# Patient Record
Sex: FEMALE | Race: BLACK OR AFRICAN AMERICAN
[De-identification: names, ages, dates, MRNs, and addresses within clinical notes are randomized per-mention and may not be internally consistent; named-entity substitution may affect disease eponyms.]

---

## 2017-07-29 ENCOUNTER — HOSPITAL ENCOUNTER (INPATIENT)
Dept: HOSPITAL 74 - JER | Age: 22
LOS: 12 days | Discharge: HOME | DRG: 872 | End: 2017-08-10
Attending: INTERNAL MEDICINE | Admitting: INTERNAL MEDICINE
Payer: COMMERCIAL

## 2017-07-29 VITALS — BODY MASS INDEX: 19.5 KG/M2

## 2017-07-29 DIAGNOSIS — K51.90: ICD-10-CM

## 2017-07-29 DIAGNOSIS — E87.6: ICD-10-CM

## 2017-07-29 DIAGNOSIS — T38.0X5A: ICD-10-CM

## 2017-07-29 DIAGNOSIS — L29.2: ICD-10-CM

## 2017-07-29 DIAGNOSIS — D62: ICD-10-CM

## 2017-07-29 DIAGNOSIS — K62.5: ICD-10-CM

## 2017-07-29 DIAGNOSIS — D72.828: ICD-10-CM

## 2017-07-29 DIAGNOSIS — E83.51: ICD-10-CM

## 2017-07-29 DIAGNOSIS — Z51.89: ICD-10-CM

## 2017-07-29 DIAGNOSIS — A41.9: Primary | ICD-10-CM

## 2017-07-29 DIAGNOSIS — K64.4: ICD-10-CM

## 2017-07-29 DIAGNOSIS — R31.9: ICD-10-CM

## 2017-07-29 PROCEDURE — P9058 RBC, L/R, CMV-NEG, IRRAD: HCPCS

## 2017-07-29 PROCEDURE — P9038 RBC IRRADIATED: HCPCS

## 2017-07-29 NOTE — PDOC
*Physical Exam





- Vital Signs


 Last Vital Signs











Temp Pulse Resp BP Pulse Ox


 


 99.5 F   119 H  20   102/45   98 


 


 07/29/17 21:31  07/29/17 21:31  07/29/17 21:31  07/29/17 21:31  07/29/17 21:31














- Physical Exam


Comments: 





07/29/17 23:58


Patient is a 21 year old male





Medical Decision Making





- Medical Decision Making





07/29/17 23:59


21 year old male

## 2017-07-30 LAB
ALBUMIN SERPL-MCNC: 2.8 G/DL (ref 3.4–5)
ALP SERPL-CCNC: 61 U/L (ref 45–117)
ALT SERPL-CCNC: 9 U/L (ref 12–78)
ANION GAP SERPL CALC-SCNC: 13 MMOL/L (ref 8–16)
APPEARANCE UR: CLEAR
APPEARANCE UR: CLEAR
AST SERPL-CCNC: 13 U/L (ref 15–37)
BACTERIA #/AREA URNS HPF: (no result) /HPF
BASOPHILS # BLD: 0.1 % (ref 0–2)
BILIRUB SERPL-MCNC: 0.7 MG/DL (ref 0.2–1)
BILIRUB UR STRIP.AUTO-MCNC: NEGATIVE MG/DL
BILIRUB UR STRIP.AUTO-MCNC: NEGATIVE MG/DL
CALCIUM SERPL-MCNC: 8 MG/DL (ref 8.5–10.1)
CK SERPL-CCNC: 69 IU/L (ref 26–192)
CO2 SERPL-SCNC: 24 MMOL/L (ref 21–32)
COLOR UR: (no result)
COLOR UR: YELLOW
CREAT SERPL-MCNC: 0.7 MG/DL (ref 0.55–1.02)
DEPRECATED RDW RBC AUTO: 15.3 % (ref 11.6–15.6)
DEPRECATED RDW RBC AUTO: 15.6 % (ref 11.6–15.6)
DEPRECATED RDW RBC AUTO: 15.7 % (ref 11.6–15.6)
DOHLE BOD BLD QL SMEAR: (no result)
EOSINOPHIL # BLD: 1 % (ref 0–4.5)
EOSINOPHIL # BLD: 1 % (ref 0–4.5)
FERRITIN SERPL-MCNC: 253.88 NG/ML (ref 6.9–282.5)
GLUCOSE SERPL-MCNC: 94 MG/DL (ref 74–106)
HYPOCHROMIA BLD QL SMEAR: (no result)
INR BLD: 1.61 (ref 0.82–1.09)
KETONES UR QL STRIP: (no result)
KETONES UR QL STRIP: (no result)
LEUKOCYTE ESTERASE UR QL STRIP.AUTO: (no result)
LEUKOCYTE ESTERASE UR QL STRIP.AUTO: (no result)
MCH RBC QN AUTO: 28.5 PG (ref 25.7–33.7)
MCH RBC QN AUTO: 28.8 PG (ref 25.7–33.7)
MCH RBC QN AUTO: 29 PG (ref 25.7–33.7)
MCHC RBC AUTO-ENTMCNC: 32.9 G/DL (ref 32–36)
MCHC RBC AUTO-ENTMCNC: 33.5 G/DL (ref 32–36)
MCHC RBC AUTO-ENTMCNC: 33.7 G/DL (ref 32–36)
MCV RBC: 86 FL (ref 80–96)
MCV RBC: 86.1 FL (ref 80–96)
MCV RBC: 86.4 FL (ref 80–96)
MUCOUS THREADS URNS QL MICRO: (no result)
NEUTROPHILS # BLD: 39 % (ref 42.8–82.8)
NEUTROPHILS # BLD: 82.1 % (ref 42.8–82.8)
NITRITE UR QL STRIP: NEGATIVE
NITRITE UR QL STRIP: NEGATIVE
PH UR: 6 [PH] (ref 5–8)
PH UR: 6 [PH] (ref 5–8)
PLATELET # BLD AUTO: 195 K/MM3 (ref 134–434)
PLATELET # BLD AUTO: 204 K/MM3 (ref 134–434)
PLATELET # BLD AUTO: 224 K/MM3 (ref 134–434)
PLATELET # BLD EST: ADEQUATE 10*3/UL
PLATELET # BLD EST: ADEQUATE 10*3/UL
PLATELET COMMENT2: (no result)
PMV BLD: 8.8 FL (ref 7.5–11.1)
PMV BLD: 9.4 FL (ref 7.5–11.1)
PMV BLD: 9.6 FL (ref 7.5–11.1)
POLYCHROMASIA BLD QL SMEAR: (no result)
PROT SERPL-MCNC: 6.8 G/DL (ref 6.4–8.2)
PROT UR QL STRIP: NEGATIVE
PT PNL PPP: 17.9 SEC (ref 9.98–11.88)
RBC # BLD AUTO: 1 /HPF (ref 0–3)
RBC # BLD AUTO: 1 /HPF (ref 0–3)
RBC # UR STRIP: (no result) /UL
RBC # UR STRIP: (no result) /UL
SP GR UR: 1.01 (ref 1–1.02)
SP GR UR: 1.01 (ref 1–1.02)
TOXIC GRANULES BLD QL SMEAR: (no result)
UROBILINOGEN UR STRIP-MCNC: NEGATIVE MG/DL (ref 0.2–1)
UROBILINOGEN UR STRIP-MCNC: NEGATIVE MG/DL (ref 0.2–1)
WBC # BLD AUTO: 12.2 K/MM3 (ref 4–10)
WBC # BLD AUTO: 6.9 K/MM3 (ref 4–10)
WBC # BLD AUTO: 8.3 K/MM3 (ref 4–10)
WBC # UR AUTO: 3 /HPF (ref 3–5)
WBC # UR AUTO: 4 /HPF (ref 3–5)

## 2017-07-30 RX ADMIN — SODIUM CHLORIDE SCH MLS/HR: 9 INJECTION, SOLUTION INTRAVENOUS at 09:49

## 2017-07-30 RX ADMIN — MORPHINE SULFATE PRN MG: 4 INJECTION, SOLUTION INTRAMUSCULAR; INTRAVENOUS at 21:40

## 2017-07-30 RX ADMIN — SODIUM CHLORIDE SCH MLS/HR: 9 INJECTION, SOLUTION INTRAVENOUS at 16:24

## 2017-07-30 RX ADMIN — HEPARIN SODIUM SCH UNIT: 5000 INJECTION, SOLUTION INTRAVENOUS; SUBCUTANEOUS at 10:15

## 2017-07-30 RX ADMIN — MORPHINE SULFATE PRN MG: 4 INJECTION, SOLUTION INTRAMUSCULAR; INTRAVENOUS at 17:27

## 2017-07-30 RX ADMIN — SODIUM CHLORIDE SCH MLS/HR: 9 INJECTION, SOLUTION INTRAVENOUS at 23:27

## 2017-07-30 RX ADMIN — HEPARIN SODIUM SCH UNIT: 5000 INJECTION, SOLUTION INTRAVENOUS; SUBCUTANEOUS at 21:29

## 2017-07-30 RX ADMIN — ACETAMINOPHEN PRN MG: 325 TABLET ORAL at 21:28

## 2017-07-30 NOTE — PDOC
History of Present Illness





- General


History Source: Patient


Exam Limitations: No Limitations





- History of Present Illness


Initial Comments: 


07/30/17 02:02


Patient is a 21F with no significant medical history here today complaining of 

lower abdominal pain and diarrhea for the past week. She states that she went 

to [facility] 6 days ago with the complaints, where she was given an unknown 

antibiotic. She states that she's been having 6-7 episodes of diarrhea and 

decreased PO intake. She states that she has vomited twice. She states that 

starting today, she had an episode of diarrhea with bright red blood in it. 











<Alonzo Landry - Last Filed: 07/30/17 06:27>





<Meeta Marquez - Last Filed: 07/30/17 06:36>





- General


Chief Complaint: Pain


Stated Complaint: LOWER ABD PAIN


Time Seen by Provider: 07/29/17 23:55





Past History





- Past Medical History


Other medical history: denies





- Psycho/Social/Smoking Cessation Hx


Suicidal Ideation: No


Smoking History: Never smoked





<Alonzo Landry - Last Filed: 07/30/17 06:27>





<Meeta Marquez - Last Filed: 07/30/17 06:36>





- Past Medical History


Allergies/Adverse Reactions: 


 Allergies











Allergy/AdvReac Type Severity Reaction Status Date / Time


 


No Known Allergies Allergy   Verified 07/29/17 21:37














**Review of Systems





- Review of Systems


Constitutional: Yes: Chills, Fever


HEENTM: No: Blurred Vision, Recent change in vision


Respiratory: No: Cough, Shortness of Breath


Cardiac (ROS): No: Chest Pain, Palpitations


ABD/GI: Yes: Diarrhea, Nausea, Vomiting.  No: Constipated


: No: Burning, Dysuria


Musculoskeletal: Yes: Back Pain.  No: Joint Pain


Neurological: Yes: Weakness.  No: Headache





<Alonzo Landry - Last Filed: 07/30/17 06:27>





*Physical Exam





- Vital Signs


 Last Vital Signs











Temp Pulse Resp BP Pulse Ox


 


 99.5 F   119 H  20   102/45   98 


 


 07/29/17 21:31  07/29/17 21:31  07/29/17 21:31  07/29/17 21:31  07/29/17 21:31














- Physical Exam


Comments: 


07/30/17 02:13


GENERAL: Awake, alert, and fully oriented, in no acute distress


HEAD: No signs of trauma, normocephalic, atraumatic


EYES: PERRLA, EOMI


ENT: Auricles normal inspection, hearing grossly normal, nares patent, dry 

appearing mucosa


LUNGS: No distress, speaks full sentences, clear to auscultation bilaterally


HEART: Regular rate and rhythm, normal S1 and S2, no murmurs, rubs or gallops, 

peripheral pulses normal and equal bilaterally.


ABDOMEN: Tender to palpation in suprapubic area.  Voluntary guarding, no 

rebound tenderness


EXTREMITIES: Normal inspection, Normal range of motion, no edema.  


NEUROLOGICAL: Cranial nerves II through XII grossly intact.  Normal speech, no 

focal sensorimotor deficits


SKIN: Warm, Dry, normal turgor, no rashes or lesions noted.








07/30/17 03:04


RECTAL: Small hemorrhoid, small amount of blood seen on glove, no masses, 

nontender





<Alonzo Landry - Last Filed: 07/30/17 06:27>





- Vital Signs


 Last Vital Signs











Temp Pulse Resp BP Pulse Ox


 


 99.5 F   119 H  20   102/45   98 


 


 07/29/17 21:31  07/29/17 21:31  07/29/17 21:31  07/29/17 21:31  07/29/17 21:31














<Meeta Marquez - Last Filed: 07/30/17 06:36>





ED Treatment Course





- LABORATORY


CBC & Chemistry Diagram: 


 07/30/17 01:33





 07/30/17 01:33





<Alonzo Landry - Last Filed: 07/30/17 06:27>





- LABORATORY


CBC & Chemistry Diagram: 


 07/30/17 01:33





 07/30/17 01:33





- ADDITIONAL ORDERS


Additional order review: 


 Laboratory  Results











  07/30/17 07/30/17 07/30/17





  03:59 02:00 01:33


 


INR    1.61 H


 


Sodium   


 


Potassium   


 


Chloride   


 


Carbon Dioxide   


 


Anion Gap   


 


BUN   


 


Creatinine   


 


Creat Clearance w eGFR   


 


Random Glucose   


 


Lactic Acid  0.9  


 


Calcium   


 


Total Bilirubin   


 


AST   


 


ALT   


 


Alkaline Phosphatase   


 


Total Protein   


 


Albumin   


 


Lipase   


 


Urine Color   


 


Urine Appearance   


 


Urine pH   


 


Urine Protein   


 


Urine Glucose (UA)   


 


Urine Ketones   


 


Urine Blood   


 


Urine Nitrite   


 


Urine Bilirubin   


 


Urine Urobilinogen   


 


Ur Leukocyte Esterase   


 


Urine RBC   


 


Urine WBC   


 


Ur Epithelial Cells   


 


Urine Bacteria   


 


Urine HCG, Qual   


 


Stool Occult Blood   Trace 














  07/30/17 07/30/17 07/30/17





  01:33 01:33 01:33


 


INR   


 


Sodium   132 L 


 


Potassium   3.5 


 


Chloride   95 L 


 


Carbon Dioxide   24 


 


Anion Gap   13 


 


BUN   8 


 


Creatinine   0.7 


 


Creat Clearance w eGFR   > 60 


 


Random Glucose   94 


 


Lactic Acid   


 


Calcium   8.0 L 


 


Total Bilirubin   0.7 


 


AST   13 L 


 


ALT   9 L 


 


Alkaline Phosphatase   61 


 


Total Protein   6.8 


 


Albumin   2.8 L 


 


Lipase   252 


 


Urine Color    Ltyellow


 


Urine Appearance    Clear


 


Urine pH    6.0


 


Urine Protein    Negative


 


Urine Glucose (UA)    Negative


 


Urine Ketones    1+ H


 


Urine Blood    2+ H


 


Urine Nitrite    Negative


 


Urine Bilirubin    Negative


 


Urine Urobilinogen    Negative


 


Ur Leukocyte Esterase    2+ H


 


Urine RBC    1


 


Urine WBC    4


 


Ur Epithelial Cells    Rare


 


Urine Bacteria    Few


 


Urine HCG, Qual  Negative  


 


Stool Occult Blood   








 











  07/30/17





  01:33


 


RBC  3.73


 


MCV  86.4


 


MCHC  32.9


 


RDW  15.6


 


MPV  9.4


 


Neutrophils %  82.1


 


Lymphocytes %  5.7 L


 


Monocytes %  11.1 H


 


Eosinophils %  1.0


 


Basophils %  0.1














- Medications


Given in the ED: 


ED Medications














Discontinued Medications














Generic Name Dose Route Start Last Admin





  Trade Name Freq  PRN Reason Stop Dose Admin


 


Hydromorphone HCl  0.5 mg 07/30/17 00:45 07/30/17 01:31





  Dilaudid Injection -  IVPUSH 07/30/17 00:46  0.5 mg





  ONCE ONE   Administration


 


Sodium Chloride  1,000 mls @ 1,000 mls/hr 07/30/17 00:45 07/30/17 01:31





  Normal Saline -  IV 07/30/17 01:44  1,000 mls/hr





  ASDIR STA   Administration


 


Sodium Chloride  1,000 mls @ 1,000 mls/hr 07/30/17 03:00 07/30/17 03:09





  Normal Saline -  IV 07/30/17 03:59  1,000 mls/hr





  ASDIR STA   Administration


 


Ondansetron HCl  4 mg 07/30/17 00:45 07/30/17 01:31





  Zofran Injection  IVPB 07/30/17 00:46  4 mg





  ONCE ONE   Administration














<Meeta Marquez - Last Filed: 07/30/17 06:36>





Medical Decision Making





- Medical Decision Making


07/30/17 02:15


21F with no significant medical history here today complaining of abdominal 

pain. Tachycardic, and tachypneic. Differential diagnosis includes: UTI, 

pyelonephritis, and colitis. Will evaluate with CBC, CMP, and UA initially. 

Will give 1L of NS, zofran and dilaudid for pain.





07/30/17 02:43


CBC shows a white count of 12. UA shows 2+ LE but <5 WBC. CT abdomen/pelvis 

with contrast ordered.





07/30/17 03:03


Rectal exam showed trace amount of blood and small hemorrhoid.








07/30/17 06:27


CT showed colitis. Will admit to medicine.





<Alonzo Landry - Last Filed: 07/30/17 06:27>





*DC/Admit/Observation/Transfer





<Alonzo Landry - Last Filed: 07/30/17 06:27>





- Discharge Dispostion


Admit: Yes





<Meeta Marquez - Last Filed: 07/30/17 06:36>


Diagnosis at time of Disposition: 


 Colitis, Rectal bleeding





- Discharge Dispostion


Condition at time of disposition: Guarded

## 2017-07-30 NOTE — HP
CHIEF COMPLAINT: diarrhea for 1 week





PCP:





HISTORY OF PRESENT ILLNESS:


20 y/o F with no significant PMH presents to ED with diarrhea for 1 week. As 

per pt, she ate at [employer] last Thursday (7/20). On Friday, she started having 

bloody bowel movements (7-8x daily), and felt nauseous during this time. She 

noted BRB on toilet paper when wiping and in toilet bowl. On Sunday, pt went to 

[facility] since her symptoms had not subsided, and was dx there with food 

poisoning. She was discharged with antibiotics and pain medications, which she 

took for the subsequent 3 days following. This past Thursday (7/27), pt began 

to have cramping in her lower abdomen which was constant and a 10/10.





Pt's LMP was 1 year ago, she is currently on Mirena IUD.





ER course was notable for:


(1) CT abdomen/pelvis: suggestive of colitis


(2) Dilaudid 0.5mg IVP given twice


(3) Levaquin, Flagyl for broad spectrum coverage





Recent Travel: no





PAST MEDICAL HISTORY: none





PAST SURGICAL HISTORY: none





Social History:


Smoking: denies


Alcohol: socially; once every 2 months, 2 drinks


Drugs: denies





Family History: none


Allergies





No Known Allergies Allergy (Verified 07/29/17 21:37)


 








HOME MEDICATIONS:


REVIEW OF SYSTEMS


CONSTITUTIONAL: +tactile fever


Absent:  fever, chills, diaphoresis, generalized weakness, malaise, loss of 

appetite, weight change


HEENT: 


Absent:  rhinorrhea, nasal congestion, throat pain, throat swelling, difficulty 

swallowing, mouth swelling, ear pain, eye pain, visual changes


CARDIOVASCULAR: 


Absent: chest pain, syncope, palpitations, irregular heart rate, lightheadedness

, peripheral edema


RESPIRATORY: 


Absent: cough, shortness of breath, dyspnea with exertion, orthopnea, wheezing, 

stridor, hemoptysis


GASTROINTESTINAL: +abdominal pain, +diarrhea, +hematochezia


Absent: abdominal pain, abdominal distension, nausea, vomiting, diarrhea, 

constipation, melena, hematochezia


GENITOURINARY: 


Absent: dysuria, frequency, urgency, hesitancy, hematuria, flank pain, genital 

pain


MUSCULOSKELETAL: 


Absent: myalgia, arthralgia, joint swelling, back pain, neck pain


SKIN: 


Absent: rash, itching, pallor


HEMATOLOGIC/IMMUNOLOGIC: 


Absent: easy bleeding, easy bruising, lymphadenopathy, frequent infections


ENDOCRINE:


Absent: unexplained weight gain, unexplained weight loss, heat intolerance, 

cold intolerance


NEUROLOGIC: 


Absent: headache, focal weakness or paresthesias, dizziness, unsteady gait, 

seizure, mental status changes, bladder or bowel incontinence


PSYCHIATRIC: 


Absent: anxiety, depression, suicidal or homicidal ideation, hallucinations.








PHYSICAL EXAMINATION


 Vital Signs - 24 hr











  07/30/17





  07:42


 


Temperature 98.6 F


 


Pulse Rate [ 90





Left] 


 


Respiratory 18





Rate 


 


Blood Pressure 106/60





[Left Arm] 


 


O2 Sat by Pulse 98





Oximetry (%) 











GENERAL: Awake, alert, and fully oriented, in no acute distress.


HEAD: Normal with no signs of trauma.


EYES: Pupils equal, round and reactive to light, extraocular movements intact, 

sclera anicteric, conjunctiva clear. 


EARS, NOSE, THROAT: oropharynx clear without exudates. Moist mucous membranes.


NECK: Normal range of motion, supple without lymphadenopathy, no JVD


LUNGS: Breath sounds equal, clear to auscultation bilaterally. No wheezes, and 

no crackles. No accessory muscle use.


HEART: Regular rate and rhythm, normal S1 and S2 without murmur, rub or gallop.


ABDOMEN: +BS in all quadrants, tender to palpation in RLQ and LLQ, no distension

, warm, no voluntary guarding or rigidity


MUSCULOSKELETAL: Normal range of motion at all joints. No bony deformities or 

tenderness. No CVA tenderness.


UPPER EXTREMITIES: 2+ radial pulses, warm, well-perfused. No cyanosis. No 

clubbing. No peripheral edema.


LOWER EXTREMITIES: 2+ posterior tibial pulses, warm, well-perfused. No calf 

tenderness. No peripheral edema. 


NEUROLOGICAL:  Cranial nerves II-XII intact.


RECTAL EXAM: minor amount of BRB on glove, I did not note any masses in rectum





IMAGING


-CT abdomen/pelvis: suggestive of colitis





ASSESSMENT/PLAN:





20 y/o F with no significant PMH presents to ED with 1 week of diarrhea. Pt 

admitted for sepsis secondary to possible infectious colitis.





1. Sepsis secondary to possible infectious colitis 


-Tachycardia on admission (119HR), leukocytosis (12.2), febrile after admission 

(102F)


-CT: confirmed colitis, infectious cause: food-borne most likely, sx began 

after meal


-Received Levaquin, Flagyl in ED for broad-spec coverage


-Determine infectious etiology- EHEC, Shigella, Salmonella to treat accordingly


-F/u stool cx to determine etiology


-can f/u with GI as outpt after d/c





2. asymptomatic, +leukocyte esterase on  UA


-Will send urine cx, though most likely will be neg since abx have already been 

started





3. BRBPR 


-F/u CBC


-Currently: 10.6/32.2





F/E/N


-IV  cc/hr


-monitor electrolytes


-clear liquid diet 








Visit type





- Emergency Visit


Emergency Visit: Yes


ED Registration Date: 07/30/17


Care time: The patient presented to the Emergency Department on the above date 

and was hospitalized for further evaluation of their emergent condition.





- New Patient


This patient is new to me today: Yes


Date on this admission: 07/30/17





- Critical Care


Critical Care patient: No

## 2017-07-30 NOTE — PN
Teaching Attending Note


Name of Resident: Vicenta Elam


ATTENDING PHYSICIAN STATEMENT





I saw and evaluated the patient.


I reviewed the resident's note and discussed the case with the resident.


I agree with the resident's findings and plan as documented.








SUBJECTIVE:20yo F with no PMH presenting with bloody diarrhea x 9 days. 7-8 

watery large loose BM. went to [facility] last week and was given abx which she 

took for 1 week with some mild relief however 4 days ago diarrhea became bloody 

assoc with cramping and abdominal pain. pt ate a [employer] the day prior to 

symptoms start. denies hx of diarrhea/constipation, denies weight loss, CP, SOB

< fever, chills, abdominal pain worse at night, arthralgias, skin lesions, eye 

infections, no recent travel or camping. no sick contacts. LMP a year ago as 

currently has IUD. last saw PMD 3 weeks ago with routine lab work normal per pt.


as per mother in the room, she did not have issues gaining weight as a child 

and met milestones. no family hx of autoimmune diseases, UC or crohns








OBJECTIVE:


 Last Vital Signs











Temp Pulse Resp BP Pulse Ox


 


 102.4 F H  120 H  18   145/65   98 


 


 07/30/17 10:00  07/30/17 10:00  07/30/17 10:00  07/30/17 10:00  07/30/17 10:00








General lethargic


CV S1 S2 tachycardic


Lungs CTA B/L no wheezing/rales/rhonchi


Abdomen soft, diffusely tender, mild distention, hypoactive BS


rectal deferred





ASSESSMENT AND PLAN:


20yo F wtih no PMH presented with persistent bloody diarrhea


1. Bloody diarrhea- medicine admission. Tm 102.4. since pt is having 7-8BM/day, 

bloody and persisting >1week will start abx at this time. prelim read on CT 

consistent with colitis, will await for official read. start Levaquin/Flagyl, 

IVF, clear liquid diet. check Bcx, stool Cx, fecal leukocytes, cdiff. concern 

for IBD due to her age, however no risk factors associated with it. check ESR/

CRP


2. Acute blood loss anemia- BRBPR, some dilutional component. will repeat in 

the evening. check iron studies. will call pmd tomorrow to obtain baseline labs 

to compare. 


3. Hematuria- due to infection vs dehydration. RBC does not correlate. check CK 

to r/o rhabdo


4. DVT ppx- SCD. hold pharmacologic in setting of anemia

## 2017-07-30 NOTE — HP
CHIEF COMPLAINT: Abdominal pain, hematochezia and fever





PCP:





HISTORY OF PRESENT ILLNESS:


21 yr old woman with no significant pmhx presents to ED with abdominal pain, 

dairrhea, hematochezia and fever. Was seen at [facility] on Sunday for 

similar complaint that started after eating at [employer] on friday. symptoms 

started Friday night, no one else who ate with her has similar complaints. 

[facility] prescribed 7-day course of antibiotics, which she finished. doesn't 

recall the name. When her symptoms did not improve, she came to [facility]. Abdominal 

pain is diffuse and continuous, no exacerbating or alleviating factors. Blood 

was bright red with wiping and on the outside the stool, at times mixed in, it 

did not fill the toilet bowl. 


denies vomiting, sob, previous episodes.








ER course was notable for:


(1) abd/pelvic CT


(2) lactic acid 0.9, bld cx drawn


(3) levoquin + flagyl 1 dose


(4) no lactic acidosis





PAST MEDICAL HISTORY: none





PAST SURGICAL HISTORY: 


Mirena inserted





Social History:


Smoking: denies


Alcohol: socially every 2 months


Drugs: denies





Family History: denies FM of GI dx, cancers, HTN, DM


Allergies





No Known Allergies Allergy (Verified 07/29/17 21:37)


 








HOME MEDICATIONS:





REVIEW OF SYSTEMS


CONSTITUTIONAL: 


Present:fever,


Absent:  chills, diaphoresis, generalized weakness, malaise, loss of appetite, 

weight change


HEENT: 


Absent:  rhinorrhea, nasal congestion, throat pain, throat swelling, difficulty 

swallowing, mouth swelling, 


CARDIOVASCULAR: 


Absent: chest pain, syncope, palpitations, irregular heart rate, lightheadedness

, peripheral edema


RESPIRATORY: 


Absent: cough, shortness of breath


GASTROINTESTINAL:


Present: abdominal pain, diarrhea, hematochezia


Absent:  abdominal distension, nausea, vomiting, constipation, melena, 


GENITOURINARY: 


Absent: dysuria, frequency, urgency, hesitancy, hematuria, flank pain, genital 

pain


SKIN: 


Absent: rash, itching, pallor


NEUROLOGIC: 


Absent: headache, focal weakness, dizziness,











PHYSICAL EXAMINATION


 Vital Signs - 24 hr











  07/30/17





  07:42


 


Temperature 98.6 F


 


Pulse Rate [ 90





Left] 


 


Respiratory 18





Rate 


 


Blood Pressure 106/60





[Left Arm] 


 


O2 Sat by Pulse 98





Oximetry (%) 











GENERAL: Awake, alert, and fully oriented, in no acute distress.


ABDOMEN: Soft, ttp in right and left lower quadrants and suprapubically, not 

distended, normoactive bowel sounds, no guarding, no rebound, no masses.  

obturator and psoas negative





ASSESSMENT/PLAN:


21 y old woman presents with hematochezia, dairrgea and abdominal pain admitted 

for sepsis likely due to colitis.





#Colitis - inflammatory changes seen on abd/pelvis CT, clinically withe diarrhea

/abd pain, hematochezia + fever + tachycardia


- likely infectious in nature due to onset after meal/fever, r/o stool pathogens

, send stool cultures, bld cultures pending


- IVF @ NS 150cc/hr


- tylenol 650mg po for fevers/pain, will escalate pain meds if needed


- levaquin 500mg IVPB Q24hr, + flagyl 500mg q8hr


- [facility] is closed, unable to obtain abx/meds, will call again tomorrow





#Anemia, normocytic


- pt is not menstruating for past yr since mirena, unlikely that this is her 

baseline, will try to obtain records from [facility] to compare trend


- trend CBC, monitor for acute drop in H/H due to hematochezia,  get type and 

screen with next blood draw





#Leuk 3+ in urine


- pt denies symptoms of UTI, could be due to dehydration, will repeat u/a


- urine cultures will be obtained after abx, but she will be on abx for colitis 

which should cover her anyway for any UTI





DVT: heparin SQ 5000 units BID


Diet: start with clears, advance as tolerated














Visit type





- Emergency Visit


Emergency Visit: Yes


ED Registration Date: 07/30/17


Care time: The patient presented to the Emergency Department on the above date 

and was hospitalized for further evaluation of their emergent condition.





- New Patient


This patient is new to me today: Yes


Date on this admission: 07/30/17





- Critical Care


Critical Care patient: No

## 2017-07-30 NOTE — PDOC
Attending Attestation





- HPI


HPI: 


The patient is a 20 yo F with no significant past medical history who presents 

with lower abdominal pain, diarrhea, and vomiting for 1 week. The patient 

states she went to [facility] on Sunday with diarrhea and abdominal pain and was 

diagnosed with food poisoning and prescribed an antibiotic. The patient states 

her symptoms have not resolved. The patient reports 6-7 episodes of diarrhea 

today. She also endorses nausea and vomiting x2. The patient reports she had a 

fever of 102.3 a few days ago. The patient also reports her most recent 

episodes of diarrhea today had bright red blood. The patient denies hope tarry 

stools.  The patient states she was also prescribed something for pain. The 

patient denies chest pain, palpitations and lightheadedness.  The patient 

denies subjective chills. 





- Medical Decision Making





Documentation prepared by Cassy Cisneros, acting as medical scribe for 

Marilyn Sierra MD, MD/DO.





<Cassy Cisneros - Last Filed: 07/30/17 01:36>





- Resident


Resident Name: Alonzo Landry





- ED Attending Attestation


I have performed the following: I have examined & evaluated the patient, The 

case was reviewed & discussed with the resident, I agree w/resident's findings 

& plan, Exceptions are as noted





- Physicial Exam


PE: 


GENERAL: Awake, alert, and fully oriented. Appears uncomfortable. +Mild pallor. 


HEAD: No signs of trauma


EYES: PERRLA, EOMI, sclera anicteric, conjunctiva clear


ENT: Auricles normal inspection, hearing grossly normal, nares patent, 

oropharynx clear without exudates. Dry mucosa


NECK: Normal ROM, supple, no lymphadenopathy, JVD, or masses


LUNGS: Breath sounds equal, clear to auscultation bilaterally.  No wheezes, and 

no crackles


HEART: Regular rate and rhythm, normal S1 and S2, no murmurs, rubs or gallops


ABDOMEN: Soft, diffusely tender, worse in BLQ, +hyperactive bowel sounds. +

Guarding, no rebound.  No masses. +B/L CVAT.


EXTREMITIES: Normal range of motion, no edema.  No clubbing or cyanosis. No 

cords, erythema, or tenderness


NEUROLOGICAL: Cranial nerves II through XII grossly intact.  Normal speech, 

normal gait


SKIN: Warm, Dry, normal turgor, no rashes or lesions noted.











- Medical Decision Making


Pt presents with diarrhea, diffuse abdominal pain for the past few days. 

Intermittent fever. She was treated with abx for (?) food poisoning at an 

outside hospital. She cannot recall the name of the antibiotic. She has 

worsened since then, and now has noted bloody stools. Exam with diffuse abd 

tenderness. +Small hemorrhoid at the 12 o'clock position, trace blood on rectal 

exam. UA with no significant findings. Awaiting labs and CT a/p, possible 

colitis. 








<Marilyn Sierra - Last Filed: 07/30/17 03:52>

## 2017-07-31 LAB
ANION GAP SERPL CALC-SCNC: 8 MMOL/L (ref 8–16)
BASOPHILS # BLD: 1 % (ref 0–2)
CALCIUM SERPL-MCNC: 7.5 MG/DL (ref 8.5–10.1)
CO2 SERPL-SCNC: 24 MMOL/L (ref 21–32)
CREAT SERPL-MCNC: 0.5 MG/DL (ref 0.55–1.02)
DEPRECATED RDW RBC AUTO: 15.9 % (ref 11.6–15.6)
EOSINOPHIL # BLD: 1 % (ref 0–4.5)
GLUCOSE SERPL-MCNC: 120 MG/DL (ref 74–106)
MCH RBC QN AUTO: 29.3 PG (ref 25.7–33.7)
MCHC RBC AUTO-ENTMCNC: 34.1 G/DL (ref 32–36)
MCV RBC: 85.8 FL (ref 80–96)
METAMYELOCYTES NFR BLD: 4 % (ref 0–2)
NEUTROPHILS # BLD: 56 % (ref 42.8–82.8)
PLATELET # BLD AUTO: 196 K/MM3 (ref 134–434)
PLATELET # BLD EST: ADEQUATE 10*3/UL
PMV BLD: 9.7 FL (ref 7.5–11.1)
WBC # BLD AUTO: 9.1 K/MM3 (ref 4–10)

## 2017-07-31 RX ADMIN — MORPHINE SULFATE PRN MG: 4 INJECTION, SOLUTION INTRAMUSCULAR; INTRAVENOUS at 04:53

## 2017-07-31 RX ADMIN — LEVOFLOXACIN SCH MLS/HR: 5 INJECTION, SOLUTION INTRAVENOUS at 09:13

## 2017-07-31 RX ADMIN — MORPHINE SULFATE PRN MG: 4 INJECTION, SOLUTION INTRAMUSCULAR; INTRAVENOUS at 16:41

## 2017-07-31 RX ADMIN — SODIUM CHLORIDE SCH MLS/HR: 9 INJECTION, SOLUTION INTRAVENOUS at 14:18

## 2017-07-31 RX ADMIN — HEPARIN SODIUM SCH UNIT: 5000 INJECTION, SOLUTION INTRAVENOUS; SUBCUTANEOUS at 09:14

## 2017-07-31 RX ADMIN — MORPHINE SULFATE PRN MG: 4 INJECTION, SOLUTION INTRAMUSCULAR; INTRAVENOUS at 21:21

## 2017-07-31 RX ADMIN — HEPARIN SODIUM SCH UNIT: 5000 INJECTION, SOLUTION INTRAVENOUS; SUBCUTANEOUS at 21:19

## 2017-07-31 RX ADMIN — SODIUM CHLORIDE SCH MLS/HR: 9 INJECTION, SOLUTION INTRAVENOUS at 20:30

## 2017-07-31 RX ADMIN — ACETAMINOPHEN PRN MG: 325 TABLET ORAL at 18:21

## 2017-07-31 NOTE — PN
Teaching Attending Note


Name of Resident: Cullen Echevarria


ATTENDING PHYSICIAN STATEMENT





I saw and evaluated the patient.


I reviewed the resident's note and discussed the case with the resident.


I agree with the resident's findings and plan as documented.








SUBJECTIVE:called by RN that pt is having excruciating abdominal pain. pt 

examined c/o diffuse pain worse in the RLQ/LLQ. also noted to abdomen to be 

more distended then yesterday. denies CP, SOB, fever, chills, N/V


continues to have multiple loose stools, non-bloody








OBJECTIVE:


 Last Vital Signs











Temp Pulse Resp BP Pulse Ox


 


 99.1 F   109 H  18   104/72   98 


 


 07/31/17 06:00  07/31/17 06:00  07/31/17 06:00  07/31/17 06:00  07/30/17 21:00











General NAD


CV S1 S2 tachycardic


Lungs CTA B/L no wheezing/rales/rhonchi


Abdomen soft, diffusely tender, mild distention, hypoactive BS


rectal deferred





ASSESSMENT AND PLAN:


20yo F wtih no PMH presented with persistent bloody diarrhea


1. Bloody diarrhea- called by radiologist that there is concern for 

pneumoperitoneum. in setting of sudden onset abdominal pain that pt is 

devloping or has pneumoperitoneum. stat CT abdomen/pelvis ordered. d/w surgery 

about concern and will follow up results. continues to be febrile with diarrhea 

however diarrhea is improving. NPO for possible surgery. cont Levaquin/Flagyl 

day 2. IVF. pain and nausea control. Cx pending


2. Acute blood loss anemia- BRBPR, some dilutional component. now stable. iron 

panel pending.  


3. Hematuria- due to infection vs dehydration. RBC does not correlate. CK 

normal. repeat UA shows less hematuria


4. DVT ppx- SCD. hold pharmacologic in setting of anemia





CC TIME 40 minutes. The care of this patient involved high complexity decision 

making to prevent further life threatening deterioration of the patient's 

condition and/or to evalute & treat vital organ system(s) failure or risk of 

failure.

## 2017-07-31 NOTE — CONSULT
- Consultation


REQUESTING PROVIDER: Luisana Hamilton MD





CONSULT REQUEST: We have been asked to surgically evaluate this patient for 

abdominal pain





PCP:Lusiana Hamilton





HISTORY OF PRESENT ILLNESS: CTSP for evaluation of diffuse abdominal pain and 

possible pneumoperitoneum on CT scanning done on admission; patient presented 

to another institution and was started on antibiotics for food poisoning/colitis

; came here with similar c/o's # days later and bloody liquid stools which have 

been improving; initial CT showed coloitis and ? free air; f/u CT did not ; I 

reviewed this w/ Dr. Muir.





PMHx:    none      





PSHx:   none     


 Allergies











Allergy/AdvReac Type Severity Reaction Status Date / Time


 


No Known Allergies Allergy   Verified 07/29/17 21:37














PHYSICAL EXAM:


GENERAL: Awake, alert, and fully oriented, in no acute distress.


HEAD: Normal with no signs of trauma.


EYES:  sclera anicteric, conjunctiva clear.


NECK: Normal ROM, supple without lymphadenopathy, JVD, or masses.


ABDOMEN: Soft, globally tender, not distended, normoactive bowel sounds, 

voluntary  guarding, no rebound, no masses.  No organomegaly. No hernias


MUSCULOSKELETAL: Normal ROM at all joints. No bony deformities or tenderness. 

No CVA tenderness.


UPPER EXTREMITIES: 2+ pulses, warm, well-perfused. No cyanosis. Cap refill <2 

seconds. No peripheral edema.


LOWER EXTREMITIES: 2+ pulses, warm, well-perfused. No calf tenderness. No 

peripheral edema. 


NEUROLOGICAL: Normal speech, gait not observed.


PSYCH: Cooperative. Good eye contact. Appropriate mood and affect.


SKIN: Warm, dry, normal turgor, no rashes or lesions noted.


 Vital Signs











Temperature  101.5 F H  07/31/17 18:36


 


Pulse Rate  107 H  07/31/17 18:36


 


Respiratory Rate  18   07/31/17 18:36


 


Blood Pressure  101/57   07/31/17 18:36


 


O2 Sat by Pulse Oximetry (%)  98   07/30/17 21:00








 Lab Results











WBC  9.1 K/mm3 (4.0-10.0)  D 07/31/17  06:00    


 


RBC  3.29 M/mm3 (3.60-5.2)  L  07/31/17  06:00    


 


Hgb  9.6 GM/dL (10.7-15.3)  L  07/31/17  06:00    


 


Hct  28.3 % (32.4-45.2)  L  07/31/17  06:00    


 


MCV  85.8 fl (80-96)   07/31/17  06:00    


 


MCHC  34.1 g/dl (32.0-36.0)   07/31/17  06:00    


 


RDW  15.9 % (11.6-15.6)  H  07/31/17  06:00    


 


Plt Count  196 K/MM3 (134-434)   07/31/17  06:00    


 


Sodium  138 mmol/L (136-145)   07/31/17  06:00    


 


Potassium  3.2 mmol/L (3.5-5.1)  L  07/31/17  06:00    


 


Chloride  106 mmol/L ()  D 07/31/17  06:00    


 


Carbon Dioxide  24 mmol/L (21-32)   07/31/17  06:00    


 


Anion Gap  8  (8-16)   07/31/17  06:00    


 


BUN  4 mg/dL (7-18)  L D 07/31/17  06:00    


 


Creatinine  0.5 mg/dL (0.55-1.02)  L D 07/31/17  06:00    


 


Random Glucose  120 mg/dL ()  H D 07/31/17  06:00    


 


Calcium  7.5 mg/dL (8.5-10.1)  L  07/31/17  06:00    


 


Blood Type  O POSITIVE   07/30/17  19:13    


 


Antibody Screen  Negative   07/30/17  18:29    


 


INR  1.61  (0.82-1.09)  H  07/30/17  01:33    








CT a/p x 2 reviewed





IMP: colitis; no evidence of an acute surgical abdomen/no indication for 

surgical intervention at this time








PLAN: NPO/IVF; rest of management per primary care team; suggest GI evaluation; 

will f/u.








Ollie Guerrero MD FACS





Visit type





- Case Type


Case Type: ED Admission





- Emergency


Emergency Visit: Yes


ED Registration Date: 07/30/17


Care time: The patient presented to the Emergency Department on the above date 

and was hospitalized for further evaluation of their emergent condition.





- New patient


This patient is new to me today: Yes


Date on this admission: 07/31/17

## 2017-07-31 NOTE — PN
Physical Exam: 


SUBJECTIVE: Patient seen and examined at bedside. Pt endorses mild, diffuse 

abdominal pain and is no longer nauseous. She is still having loose bowel 

movements with clotted blood mixed in, however number of episodes is much 

improved from yesterday. Denies SOB, chest or extremity pain. 








OBJECTIVE:





 Vital Signs











 Period  Temp  Pulse  Resp  BP Sys/Hung  Pulse Ox


 


 Last 24 Hr  99.1 F-102.4 F    18-18  /66-74  98











GENERAL: The patient is awake, alert, and fully oriented, in mild distress


HEAD: Normal with no signs of trauma.


EYES: PERRL, extraocular movements intact, sclera anicteric, conjunctiva clear.


NECK: Trachea midline, supple. 


LUNGS: Breath sounds equal, clear to auscultation bilaterally, no wheezes, no 

crackles, no 


accessory muscle use. 


HEART: Regular rate and rhythm, S1, S2 without murmur, rub or gallop.


ABDOMEN: Soft, mildly tender, mildly distended, normoactive bowel sounds, no 

guarding, no 


rebound


EXTREMITIES: 2+ posterior tibial pulses, warm, well-perfused, no edema. 


NEUROLOGICAL: Cranial nerves II through XII grossly intact.














 Laboratory Results - last 24 hr











  07/30/17 07/30/17 07/30/17





  16:30 17:39 18:29


 


WBC    6.9


 


RBC    3.27 L


 


Hgb    9.4 L


 


Hct    28.1 L


 


MCV    86.0


 


MCH    28.8


 


MCHC    33.5


 


RDW    15.3


 


Plt Count    195


 


MPV    9.6


 


Neutrophils %    39.0 L D


 


Lymphocytes %    10.0  D


 


Monocytes %    21.0 H D


 


Eosinophils %    1.0


 


Basophils %   


 


Band Neutrophils    29.0 H


 


Metamyelocytes   


 


Differential Comment   


 


Toxic Granulation    1+


 


Dohle Bodies    2+


 


Platelet Estimate    Adequate


 


Platelet Comment    No clumping noted


 


Polychromasia    Few


 


Hypochromic-Microcytic    Few


 


Sodium   


 


Potassium   


 


Chloride   


 


Carbon Dioxide   


 


Anion Gap   


 


BUN   


 


Creatinine   


 


Random Glucose   


 


Calcium   


 


Ferritin  253.876  


 


Creatine Kinase  69  


 


Urine Color   Yellow 


 


Urine Appearance   Clear 


 


Urine pH   6.0 


 


Ur Specific Gravity   1.015 


 


Urine Protein   Negative 


 


Urine Glucose (UA)   Negative 


 


Urine Ketones   1+ H 


 


Urine Blood   1+ H 


 


Urine Nitrite   Negative 


 


Urine Bilirubin   Negative 


 


Urine Urobilinogen   Negative 


 


Ur Leukocyte Esterase   1+ H 


 


Urine RBC   1 


 


Urine WBC   3 


 


Ur Epithelial Cells   Few 


 


Urine Mucus   Rare 


 


Blood Type   


 


Antibody Screen   














  07/30/17 07/30/17 07/31/17





  18:29 19:13 06:00


 


WBC    9.1  D


 


RBC    3.29 L


 


Hgb    9.6 L


 


Hct    28.3 L


 


MCV    85.8


 


MCH    29.3


 


MCHC    34.1


 


RDW    15.9 H


 


Plt Count    196


 


MPV    9.7


 


Neutrophils %    56.0  D


 


Lymphocytes %    12.0


 


Monocytes %    14.0 H


 


Eosinophils %    1.0


 


Basophils %    1.0  D


 


Band Neutrophils    12.0 H D


 


Metamyelocytes    4 H


 


Differential Comment    Manual diff done


 


Toxic Granulation   


 


Dohle Bodies   


 


Platelet Estimate    Adequate


 


Platelet Comment   


 


Polychromasia   


 


Hypochromic-Microcytic   


 


Sodium   


 


Potassium   


 


Chloride   


 


Carbon Dioxide   


 


Anion Gap   


 


BUN   


 


Creatinine   


 


Random Glucose   


 


Calcium   


 


Ferritin   


 


Creatine Kinase   


 


Urine Color   


 


Urine Appearance   


 


Urine pH   


 


Ur Specific Gravity   


 


Urine Protein   


 


Urine Glucose (UA)   


 


Urine Ketones   


 


Urine Blood   


 


Urine Nitrite   


 


Urine Bilirubin   


 


Urine Urobilinogen   


 


Ur Leukocyte Esterase   


 


Urine RBC   


 


Urine WBC   


 


Ur Epithelial Cells   


 


Urine Mucus   


 


Blood Type  O POSITIVE  O POSITIVE 


 


Antibody Screen  Negative  














  07/31/17





  06:00


 


WBC 


 


RBC 


 


Hgb 


 


Hct 


 


MCV 


 


MCH 


 


MCHC 


 


RDW 


 


Plt Count 


 


MPV 


 


Neutrophils % 


 


Lymphocytes % 


 


Monocytes % 


 


Eosinophils % 


 


Basophils % 


 


Band Neutrophils 


 


Metamyelocytes 


 


Differential Comment 


 


Toxic Granulation 


 


Dohle Bodies 


 


Platelet Estimate 


 


Platelet Comment 


 


Polychromasia 


 


Hypochromic-Microcytic 


 


Sodium  138


 


Potassium  3.2 L


 


Chloride  106  D


 


Carbon Dioxide  24


 


Anion Gap  8


 


BUN  4 L D


 


Creatinine  0.5 L D


 


Random Glucose  120 H D


 


Calcium  7.5 L


 


Ferritin 


 


Creatine Kinase 


 


Urine Color 


 


Urine Appearance 


 


Urine pH 


 


Ur Specific Gravity 


 


Urine Protein 


 


Urine Glucose (UA) 


 


Urine Ketones 


 


Urine Blood 


 


Urine Nitrite 


 


Urine Bilirubin 


 


Urine Urobilinogen 


 


Ur Leukocyte Esterase 


 


Urine RBC 


 


Urine WBC 


 


Ur Epithelial Cells 


 


Urine Mucus 


 


Blood Type 


 


Antibody Screen 








Active Medications











Generic Name Dose Route Start Last Admin





  Trade Name Caity  PRN Reason Stop Dose Admin


 


Acetaminophen  650 mg 07/30/17 09:52 07/30/17 21:28





  Tylenol -  PO   650 mg





  Q4H PRN   Administration





  FEVER OR PAIN   


 


Heparin Sodium (Porcine)  5,000 unit 07/30/17 10:00 07/31/17 09:14





  Heparin -  SQ   5,000 unit





  BID NORM   Administration


 


Sodium Chloride  1,000 mls @ 150 mls/hr 07/30/17 09:00 07/31/17 14:18





  Normal Saline -  IV   150 mls/hr





  ASDIR NORM   Administration


 


Levofloxacin  100 mls @ 100 mls/hr 07/31/17 10:00 07/31/17 09:13





  Levaquin 500 Mg Premixed Ivpb -  IVPB   100 mls/hr





  DAILY NORM   Administration


 


Metronidazole  500 mg 07/30/17 14:00 07/31/17 14:18





  Flagyl -  PO   500 mg





  TID NORM   Administration


 


Morphine Sulfate  2 mg 07/31/17 10:26  





  Morphine Injection -  IVPUSH   





  Q4H PRN   





  PAIN   


 


Ondansetron HCl  4 mg 07/31/17 11:57 07/31/17 12:25





  Zofran Injection  IVPUSH 07/31/17 23:58  4 mg





  Q4H PRN   Administration





  NAUSEA AND/OR VOMITING   


 


Oxycodone HCl  5 mg 07/30/17 15:33  





  Roxicodone -  PO   





  Q4H PRN   





  PAIN   











ASSESSMENT/PLAN:





20 y/o F with no significant PMH presents to ED with 1 week of diarrhea. Pt 

admitted for sepsis secondary to possible infectious colitis.





# Sepsis secondary to possible infectious colitis 


-Stool cx: no growth, Campylobacter still pending


-Stat CT abdomen/pelvis: no evidence of pneumoperitoneum


-Zofran PRN 


-Continue Levaquin 500mg IVPB daily, Flagyl 500mg PO q8H (Today is Day2)





2. asymptomatic, +leukocyte esterase on  UA


-F/u urine cx





3. BRBPR 


-improved 9.8/28.3


-F/u CBC





F/E/N


-IV  cc/hr


-monitor electrolytes


-clear liquid diet 





Visit type





- Emergency Visit


Emergency Visit: No





- New Patient


This patient is new to me today: No





- Critical Care


Critical Care patient: No

## 2017-08-01 LAB
ANION GAP SERPL CALC-SCNC: 7 MMOL/L (ref 8–16)
BASOPHILS # BLD: 0.2 % (ref 0–2)
CALCIUM SERPL-MCNC: 7.6 MG/DL (ref 8.5–10.1)
CO2 SERPL-SCNC: 25 MMOL/L (ref 21–32)
CREAT SERPL-MCNC: 0.5 MG/DL (ref 0.55–1.02)
DEPRECATED RDW RBC AUTO: 16 % (ref 11.6–15.6)
EOSINOPHIL # BLD: 2 % (ref 0–4.5)
GLUCOSE SERPL-MCNC: 85 MG/DL (ref 74–106)
HIV 1 & 2 AB: NEGATIVE
HIV 1 AGP24: NEGATIVE
IRON SERPL-MCNC: 13 UG/DL (ref 27–159)
MAGNESIUM SERPL-MCNC: 2.1 MG/DL (ref 1.8–2.4)
MCH RBC QN AUTO: 28.7 PG (ref 25.7–33.7)
MCHC RBC AUTO-ENTMCNC: 33.2 G/DL (ref 32–36)
MCV RBC: 86.3 FL (ref 80–96)
NEUTROPHILS # BLD: 76 % (ref 42.8–82.8)
PLATELET # BLD AUTO: 199 K/MM3 (ref 134–434)
PMV BLD: 10.1 FL (ref 7.5–11.1)
TIBC SERPL-MCNC: 169 UG/DL (ref 250–450)
UIBC SERPL-MCNC: 156 UG/DL (ref 131–425)
WBC # BLD AUTO: 11.5 K/MM3 (ref 4–10)

## 2017-08-01 RX ADMIN — MORPHINE SULFATE PRN MG: 4 INJECTION, SOLUTION INTRAMUSCULAR; INTRAVENOUS at 03:46

## 2017-08-01 RX ADMIN — POTASSIUM CHLORIDE SCH MLS/HR: 7.46 INJECTION, SOLUTION INTRAVENOUS at 21:51

## 2017-08-01 RX ADMIN — SODIUM CHLORIDE SCH: 9 INJECTION, SOLUTION INTRAVENOUS at 09:37

## 2017-08-01 RX ADMIN — ACETAMINOPHEN PRN MG: 325 TABLET ORAL at 01:13

## 2017-08-01 RX ADMIN — METRONIDAZOLE SCH MLS/HR: 500 INJECTION, SOLUTION INTRAVENOUS at 19:45

## 2017-08-01 RX ADMIN — MORPHINE SULFATE PRN MG: 4 INJECTION, SOLUTION INTRAMUSCULAR; INTRAVENOUS at 13:07

## 2017-08-01 RX ADMIN — MORPHINE SULFATE PRN MG: 4 INJECTION, SOLUTION INTRAMUSCULAR; INTRAVENOUS at 08:20

## 2017-08-01 RX ADMIN — CEFTRIAXONE SODIUM SCH GM: 2 INJECTION, POWDER, FOR SOLUTION INTRAMUSCULAR; INTRAVENOUS at 18:20

## 2017-08-01 RX ADMIN — SODIUM CHLORIDE SCH MLS/HR: 9 INJECTION, SOLUTION INTRAVENOUS at 22:03

## 2017-08-01 RX ADMIN — HEPARIN SODIUM SCH UNIT: 5000 INJECTION, SOLUTION INTRAVENOUS; SUBCUTANEOUS at 21:50

## 2017-08-01 RX ADMIN — SODIUM CHLORIDE SCH MLS/HR: 9 INJECTION, SOLUTION INTRAVENOUS at 13:15

## 2017-08-01 RX ADMIN — SODIUM CHLORIDE SCH MLS/HR: 9 INJECTION, SOLUTION INTRAVENOUS at 03:48

## 2017-08-01 RX ADMIN — MORPHINE SULFATE PRN MG: 4 INJECTION, SOLUTION INTRAMUSCULAR; INTRAVENOUS at 17:39

## 2017-08-01 RX ADMIN — HEPARIN SODIUM SCH UNIT: 5000 INJECTION, SOLUTION INTRAVENOUS; SUBCUTANEOUS at 09:34

## 2017-08-01 RX ADMIN — POTASSIUM CHLORIDE SCH: 7.46 INJECTION, SOLUTION INTRAVENOUS at 23:47

## 2017-08-01 RX ADMIN — LEVOFLOXACIN SCH MLS/HR: 5 INJECTION, SOLUTION INTRAVENOUS at 09:34

## 2017-08-01 RX ADMIN — MORPHINE SULFATE PRN MG: 4 INJECTION, SOLUTION INTRAMUSCULAR; INTRAVENOUS at 21:51

## 2017-08-01 NOTE — CON.GI
Consult


Consult Specialty:: GI - For Dr. Zarco


Referred by:: Hospitalist Service


Reason for Consultation:: Abdominal pain / diarrhea





- History of Present Illness


Chief Complaint: I have been having abdominal cramps and diarrhea


History of Present Illness: 


21F admitted through [facility] ER for evaluation of blood diarrhea and abdominal 

pain. She was in her usual state of healh up until 7/22 when she developed 

abdomial pain / cramping associated with bloody diarrhea.  She alludes to 

eating a chicken bowl from [employer] the evening before and that the abdominal 

pain began 11am that Saturday.  7/23, given persistent abdominal pain / blood 

diarrhea, she went to the [facility] ER and describes being 

told she had food poisoning, was prescribed an antibiotic (she does not recall 

the name) and had stool collected.  She was discharged home and she believes 

that she finished her anibiotics this past thurs or fri. Syptoms did not seem 

to improve ad she felt as though her lower abdomen was swollen prompting 

evaluation at the [facility] ER.  In ER, triage vitals 7/29 revealed T:99.5 P: 119 BP

: 102/45. Initial Hgb was 10.6 as well.  She had a CT scan of the abdomen and 

pelvis 7/30/17 that revealed a diffuse pan proctocolitis and there was also an 

addendum describing a subtle focus of free air in the upper abdomen with free 

fluid in the area as well.  This was a contrast study with IV contrast only. 

Ms. Gonsales was evaluated by surgery and a follow-up CT scan of the abdomen 

and pelvis without contrast was performed 7/31.  It failed to reveal free air 

with other findings remaining unchanged.  She continues to have blood diarrhea 

and fevers.  She denies recent travel, recent sick contacts with similar 

complaints, similar problems in the past, recent antibiotic use, family history 

of inflammatory bowel disease. There is o family history of colorectal cancer 

or other GI malignancy.  She states that 2 weeks prior to the onset of her 

symptoms she had a physical at the [facility] 

including blood work.  





- History Source


History Provided By: Patient, Family Member, Medical Record


Limitations to Obtaining History: No Limitations





- Past Medical History


Additional Medical History: Denies





- Past Surgical History


Additional Surgical History: IUD placement





- Alcohol/Substance Use


Hx Alcohol Use: No


History of Substance Use: reports: None





- Smoking History


Smoking history: Never smoked





- Social History


Usual Living Arrangement: Other (With family)


Occupation: Works at an elementary school


Place of Birth: [place]


History of Recent Travel: No





Home Medications





- Allergies


Allergies/Adverse Reactions: 


 Allergies











Allergy/AdvReac Type Severity Reaction Status Date / Time


 


No Known Allergies Allergy   Verified 07/29/17 21:37














Family Disease History





- Family Disease History


Family Disease History: Other: Father (alive 46: healthy), Mother (alive: 41: 

healthy), Brother (2 full 2 1/2 brothers, healthy), Son (1 son, healthy)


Other Family History: No family history of IBD/GI malignancy





Review of Systems





- Review of Systems


Constitutional: reports: Chills, Fever, Weakness.  denies: Unintentional Wgt. 

Loss


Cardiovascular: denies: Chest Pain


Respiratory: denies: SOB


Gastrointestinal: reports: Abdominal Pain, Diarrhea, Rectal Bleeding.  denies: 

Constipation, Melena





Physical Exam-GI


Vital Signs: 


 Vital Signs











Temperature  100.2 F H  08/01/17 1500


 


Pulse Rate  100 H  08/01/17 1500


 


Respiratory Rate  17   08/01/17 1500


 


  


 


  











Constitutional: Yes: Calm


Eyes: No: Sclera Icterus


Cardiovascular: Yes: Regular Rate and Rhythm.  No: Murmur


Respiratory: Yes: CTA Bilaterally


Gastrointestinal Inspection: Yes: Scars (decorative umbilical ring scar)


...Auscultate: Yes: Normoactive Bowel Sounds


...Palpate: Yes: Tenderness (diffusely), Tenderness, Rebound (diffusely)


...Percussion: No: Tympanitic


...Rectal Exam: Yes: Other (No external lesions, no masses, trace light brown 

stool, guaiac positive)


Edema: No


Integumentary: No: Rash


Neurological: Yes: Alert, Oriented


Labs: 


 CBC, BMP





 08/01/17 06:00 





 08/01/17 06:00 





 INR, PTT











INR  1.61  (0.82-1.09)  H  07/30/17  01:33    








 Hepatic Panel











Total Bilirubin  0.7 mg/dL (0.2-1.0)   07/30/17  01:33    


 


AST  13 U/L (15-37)  L  07/30/17  01:33    


 


ALT  9 U/L (12-78)  L  07/30/17  01:33    


 


Alkaline Phosphatase  61 U/L ()   07/30/17  01:33    


 


Albumin  2.8 g/dl (3.4-5.0)  L  07/30/17  01:33    














Imaging





- Results


Cat Scan: Report Reviewed, Image Reviewed





Problem List





- Problems


(1) Colitis


Assessment/Plan: 


with acute diarrhea:


Differetial would still include infectious etiology such as food poisoning / 

infectious colitis however symptoms continue to persist for quite some time now 

since to suspected meal ([employer] chicken bowl 2 fridays prior).  An atypical 

presentation of IBD would need to be considered as well given the persistence 

of symptoms, fevers.  There was also a question of a focus of air in the upper 

abdomen on initial CT scan that was not there on repeat study.  i spoke with 

Dr. Garber about this who felt that there was no need for surgical 

intervention and had reviewed the CT scans with radiology and the initial area 

of free air is in question. 


For now I would advise the following:


- Daily labs, check CRP


- Obtain stool studies/blood work that was obtained at [facility].  Current stool culture is negative however she has also been on 

antibiotics 


- Obtain baseline bloodwork from the place where she receives her primary care


- Awaiting remainder of stool studies


- Ordered Shiga producing E. Coli stool study


- Ordered IBD serologies


- Explained the need for possible flex sig with Ms. Gonsales and her mother who 

was present at bedside


- ID evaluation requested. Cotinjoaquín Abx for now. Ms. Gonsales may have been 

given just flagyl upon discharge from the [facility] ER 


Code(s): K52.9 - NONINFECTIVE GASTROENTERITIS AND COLITIS, UNSPECIFIED

## 2017-08-01 NOTE — PN
Teaching Attending Note


Name of Resident: Vicenta Elam


ATTENDING PHYSICIAN STATEMENT





I saw and evaluated the patient.


I reviewed the resident's note and discussed the case with the resident.


I agree with the resident's findings and plan as documented.








SUBJECTIVE: Patient reports that abdominal pain is less severe.








OBJECTIVE:


 Vital Signs











 Period  Temp  Pulse  Resp  BP Sys/Hung  Pulse Ox


 


 Last 24 Hr  99.5 F-102.6 F  100-108  17-18  100-130/57-70  97-98








HEART: S1S2, tachycardic


LUNGS: Clear


ABDOMEN: Soft, non-distended, mild diffuse tenderness, normal BS


EXTREMITIES: No edema








ASSESSMENT AND PLAN:


This is a 21-year-old woman wtih no medical history who presented to the ER 

with bloody diarrhea.





1. SIRS, possible sepsis secondary to infectious colitis


   - Pain and diarrhea are improving 


   - Continue Levaquin, Flagyl


2. Acute blood loss anemia


   - Hemoglobin stable

## 2017-08-01 NOTE — PN
Progress Note (short form)





- Note


Progress Note: 


ID consult dictated





imp/reccd





colitis- day #3 levaquin/flagyl


ill since 7/22


was on flagyl and immodiurm as outpt from [facility]


diffuse colitis on ct scan


stool cdiff negative


stool culture negative as well


blood in stools has resolved


suspect IBD, less likely infectious


(has at baseline 3-4 BMS daily, including at night)


continue antibiotics-switch to rocephin/flagyl


continue IVF


hiv testing (patient agrees)


esr/crp


stool for ova and parasites


gi- ?flex sig


doubt viral but will send rotavirus and norovirus PCR





records from [facility] requested











Problem List





- Problems


(1) Colitis


Code(s): K52.9 - NONINFECTIVE GASTROENTERITIS AND COLITIS, UNSPECIFIED

## 2017-08-01 NOTE — PN
Progress Note (short form)





- Note


Progress Note: 


Attending Surgeon





BM's more formed and less frequent; tolerating liquids





VSS low grade fluctuating temp





abdomen-soft; minimal global ttp; no guarding; no signs of an acute surgical 

abdomen





WBC 11.5





IMP:colitis





PLAN: Continue same; no indication for surgical intervention at this time.








Ollie Guerrero MD FACS

## 2017-08-01 NOTE — PN
Progress Note (short form)





- Note


Progress Note: 


upon further questioning, Kathryn states that she chronically has 3-4 non 

diarrhea bowel movements per day and that sometimes they awaken her from sleep. 

? if she has some underlying more chronic condition that has now flared.      





Problem List





- Problems


(1) Colitis


Code(s): K52.9 - NONINFECTIVE GASTROENTERITIS AND COLITIS, UNSPECIFIED

## 2017-08-01 NOTE — PN
Physical Exam: 


SUBJECTIVE: Patient seen and examined at bedside. Pt is no longer having bloody 

diarrhea, and states that her abdominal pain is better. Denies SOB, chest or 

lower extremity pain.








OBJECTIVE:





 Vital Signs











 Period  Temp  Pulse  Resp  BP Sys/Hung  Pulse Ox


 


 Last 24 Hr  99.5 F-102.6 F  100-108  17-18  100-130/57-70  97-98











GENERAL: The patient is awake, alert, and fully oriented, in no acute distress.


HEAD: Normal with no signs of trauma.


EYES: PERRL, extraocular movements intact, sclera anicteric, conjunctiva clear.


NECK: Trachea midline, full range of motion, supple. 


LUNGS: Breath sounds equal, clear to auscultation bilaterally, no wheezes, no 

crackles, no 


accessory muscle use. 


HEART: Regular rate and rhythm, S1, S2 without murmur, rub or gallop.


ABDOMEN: soft, mildly distended, mildly tender, + bowel sounds heard in all 4 

quadrants, no guarding, no 


rebound, no voluntary guarding


EXTREMITIES: 2+ posterior tibial pulses, warm, well-perfused, no edema. 


NEUROLOGICAL: Cranial nerves II through XII grossly intact.














 Laboratory Results - last 24 hr











  07/30/17 08/01/17 08/01/17





  16:30 06:00 06:00


 


WBC    11.5 H


 


RBC    3.57 L


 


Hgb    10.2 L


 


Hct    30.8 L


 


MCV    86.3


 


MCH    28.7


 


MCHC    33.2


 


RDW    16.0 H


 


Plt Count    199


 


MPV    10.1


 


Neutrophils %    76.0  D


 


Lymphocytes %    8.4  D


 


Monocytes %    13.4 H


 


Eosinophils %    2.0  D


 


Basophils %    0.2


 


Sodium   141 


 


Potassium   3.4 L 


 


Chloride   109 H 


 


Carbon Dioxide   25 


 


Anion Gap   7 L 


 


BUN   4 L 


 


Creatinine   0.5 L 


 


Random Glucose   85  D 


 


Calcium   7.6 L 


 


Magnesium   2.1 


 


Iron  13 L  


 


TIBC  169 L  


 


Iron Saturation  8 L  








Active Medications











Generic Name Dose Route Start Last Admin





  Trade Name Freq  PRN Reason Stop Dose Admin


 


Acetaminophen  650 mg 07/30/17 09:52 08/01/17 01:13





  Tylenol -  PO   650 mg





  Q4H PRN   Administration





  FEVER OR PAIN   


 


Heparin Sodium (Porcine)  5,000 unit 07/30/17 10:00 08/01/17 09:34





  Heparin -  SQ   5,000 unit





  BID NORM   Administration


 


Sodium Chloride  1,000 mls @ 150 mls/hr 07/30/17 09:00 08/01/17 13:15





  Normal Saline -  IV   150 mls/hr





  ASDIR NORM   Administration


 


Levofloxacin  100 mls @ 100 mls/hr 07/31/17 10:00 08/01/17 09:34





  Levaquin 500 Mg Premixed Ivpb -  IVPB   100 mls/hr





  DAILY NORM   Administration


 


Metronidazole  500 mg 07/30/17 14:00 08/01/17 13:08





  Flagyl -  PO   500 mg





  TID NORM   Administration


 


Morphine Sulfate  2 mg 07/31/17 10:26 08/01/17 13:07





  Morphine Injection -  IVPUSH   2 mg





  Q4H PRN   Administration





  PAIN   


 


Oxycodone HCl  5 mg 07/30/17 15:33  





  Roxicodone -  PO   





  Q4H PRN   





  PAIN   











ASSESSMENT/PLAN:





20 y/o F with no significant PMH presents to ED with 1 week of diarrhea. Pt 

admitted for sepsis secondary to possible infectious colitis.





# Sepsis secondary to possible IBD


-Stool cx: no growth, Campylobacter still pending


-infectious cause possibly less likely as symptoms have been persisting, 

possible flare?


-C.diff toxin negative


-Continue Levaquin 500mg IVPB daily, Flagyl 500mg PO q8H (Today is Day3)


-F/u CRP


-F/u stool cx from [facility], shiga E.coli stool study, IBD serologies, HIV, 

HCV, ova and parasite testing, rotavirus, norovirus panels


-F/u discussion on Flex sig to determine etiology


-GI on case, Dr. Go





2. asymptomatic, +leukocyte esterase on  UA


-Urine cx: no growth





3. BRBPR -resolved


-Continue to monitor H&H, CBC





F/E/N


-IV  cc/hr


-monitor electrolytes


-NPO








Visit type





- Emergency Visit


Emergency Visit: No





- New Patient


This patient is new to me today: No





- Critical Care


Critical Care patient: No

## 2017-08-01 NOTE — CONS
DATE OF CONSULTATION:

 

HISTORY:  This is a 21-year-old woman who works as a .  She

presented to the emergency room on July 29th with complaints of abdominal pain 
and

bloody stools.  She originally became ill on the 21st of July.  She had been at 
work,

and she developed cramps while at work on Friday.  She remembers when, at 11 o'
clock.

 The cramps persisted through the weekend.  She developed bloody diarrhea, and 
she

was seen at [facility] Emergency Room on the 24th.  She had stools collected at 
that

time.  She was prescribed Flagyl and Imodium, which she took at home.  She 
stayed on

these medications.  She actually went to work on Monday, Tuesday, and 
Wednesday.  On

Thursday, she noted she had more abdominal discomfort and at night had a fever 
of

103.  She spent Friday in bed, and again started having bloody diarrhea and 
fever,

and she presented to the emergency room with these complaints.  In the ER, she 
had a

CAT scan done that showed a diffuse pancolitis.  There was a question of free 
air. 

She had the CAT scan repeated that did not reveal any free air.  She was seen by

Surgery, and today she was seen by GI.  She reports that since Sunday she has 
not had

any more blood in her stools.  She continues to have large stools that she 
reports as

large volume occurring up to 6-8 times a day.  At her baseline, she moves her 
bowels

3-4 times a day and sometimes even wakes up at night to have a bowel movement.  
She

denies any family history of any bowel disease.  She has had no travel 
whatsoever. 

She has been working at the school [employer].  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 
camp

kids.  She has a 3-year-old who is well.  Evidence of in her house is well.  
There

has been no travel.  She has no unusual food preferences.  She did eat at 
[employer]

the evening before she got sick, but no one else in her family is sick.  She 
has not

recently been on any antibiotics besides those prescribed at [facility], and

there was no history of any weight loss.  They have no pets, though she does 
visit

her grandmother who has some cats.  She reports that at [facility] as well 
her

stools were bloody.  She has not gone on any field trips with her camp yet.  
Today

she reports feeling somewhat improved.

 

PAST MEDICAL HISTORY:  Notable for an IUD placement.  As well, she has had a 
child. 

She has a 3-year-old son.  She reports being HIV tested as recently as 2 years 
ago

for a physical and is HIV negative.

 

SOCIAL HISTORY:  She lives with her , her 3-year-old, and her mother-in-
law. 

There is no history of any cigarette or substance use.  She works as a teacher's

aide.

 

ALLERGIES:  She has no known drug allergies.

 

MEDICATIONS:  Her medications, which she just completed include Flagyl and 
Imodium.

 

FAMILY HISTORY:  Notable for parents who are both well.  There is no family 
history

of GI malignancy, Crohn, or ulcerative colitis.

 

REVIEW OF SYSTEMS:  She denies any weight loss.  She denies rigors but she has 
been

having fever and persistent diarrhea now since the 22nd of July. 

 

PHYSICAL EXAMINATION: 

Vital Signs:  Her current temperature is 100.9, T-max 102.6, pulse 108, blood

pressure 104/63, respiratory rate 17.  She is saturating 98%.

HEENT:  She is normocephalic.  Her eyes are anicteric.  She does not have any 
thrush.

Neck:  Supple.  She has no palpable lymphadenopathy.

Lungs:  Clear to auscultation.

Heart:  Regular rate and rhythm.

Abdomen:  Soft.  She has some mild distention.  She has diffuse abdominal pain 
on

exam.  She has positive bowel sounds.

Extremities:  Without edema.  

Skin:  She has no rash.

 

LABORATORIES:  Notable for a white count 11.5, hemoglobin 10.2, platelets 199.  
Her

INR is 1.6, BUN 4, creatinine 0.5.  Urinalysis has 1+ leukocytes with 1-3 white

cells.  Stools have trace blood.  Clostridium difficile is back and is 
negative. 

Stool for Salmonella, Shigella, Vibrio as well as Escherichia coli 0157 is 
negative. 

Blood cultures on admission are negative as well.

 

In summary, this is a young lady admitted with now a 10-day history of diarrhea 
and

fever with no involved travel, no unusual food preferences in an otherwise 
healthy

21-year-old.  Differential diagnosis at this point especially with the negative 
stool

studies would include inflammatory bowel disease.  She is being seen by GI for 
this. 

Will treat with ceftriaxone and flagyl. on day #3 levaquin/flagyl 

with continued fever  Would obtain a CRP/ESR

to check for inflammation.  Would check stool ova and parasite.  Would order 
Shiga

toxin study as well. will order viral studies as well-norovirus and rotovirus

 Will observe and make further recommendations based on her

clinical course.  She has agreed to repeat HIV testing, which I think would be 
an

important negative in this circumstance.  Further recommendations to follow.

 

 

 

RITO [id number]

DD: 08/01/2017 16:19

DT: 08/01/2017 16:41

Job #:  [id number]

[id number]

## 2017-08-02 LAB
ANION GAP SERPL CALC-SCNC: 10 MMOL/L (ref 8–16)
APTT BLD: 29.2 SECONDS (ref 26.9–34.4)
BASOPHILS # BLD: 0.1 % (ref 0–2)
CALCIUM SERPL-MCNC: 7.4 MG/DL (ref 8.5–10.1)
CO2 SERPL-SCNC: 24 MMOL/L (ref 21–32)
CREAT SERPL-MCNC: 0.4 MG/DL (ref 0.55–1.02)
CRP SERPL-MCNC: 16.6 MG/DL (ref 0–0.3)
DEPRECATED RDW RBC AUTO: 16.4 % (ref 11.6–15.6)
EOSINOPHIL # BLD: 2.3 % (ref 0–4.5)
GLUCOSE SERPL-MCNC: 82 MG/DL (ref 74–106)
INR BLD: 1.66 (ref 0.82–1.09)
MAGNESIUM SERPL-MCNC: 2.1 MG/DL (ref 1.8–2.4)
MCH RBC QN AUTO: 29.3 PG (ref 25.7–33.7)
MCHC RBC AUTO-ENTMCNC: 34 G/DL (ref 32–36)
MCV RBC: 86 FL (ref 80–96)
NEUTROPHILS # BLD: 68.6 % (ref 42.8–82.8)
PLATELET # BLD AUTO: 243 K/MM3 (ref 134–434)
PMV BLD: 9.1 FL (ref 7.5–11.1)
PT PNL PPP: 18.5 SEC (ref 9.98–11.88)
WBC # BLD AUTO: 9.5 K/MM3 (ref 4–10)

## 2017-08-02 RX ADMIN — MORPHINE SULFATE PRN MG: 4 INJECTION, SOLUTION INTRAMUSCULAR; INTRAVENOUS at 19:43

## 2017-08-02 RX ADMIN — METRONIDAZOLE SCH MLS/HR: 500 INJECTION, SOLUTION INTRAVENOUS at 02:09

## 2017-08-02 RX ADMIN — HEPARIN SODIUM SCH UNIT: 5000 INJECTION, SOLUTION INTRAVENOUS; SUBCUTANEOUS at 10:45

## 2017-08-02 RX ADMIN — CEFTRIAXONE SODIUM SCH GM: 2 INJECTION, POWDER, FOR SOLUTION INTRAMUSCULAR; INTRAVENOUS at 10:45

## 2017-08-02 RX ADMIN — MORPHINE SULFATE PRN MG: 4 INJECTION, SOLUTION INTRAMUSCULAR; INTRAVENOUS at 08:34

## 2017-08-02 RX ADMIN — SODIUM CHLORIDE SCH MLS/HR: 9 INJECTION, SOLUTION INTRAVENOUS at 16:59

## 2017-08-02 RX ADMIN — MICONAZOLE NITRATE SCH APPLIC: 20 CREAM VAGINAL at 22:46

## 2017-08-02 RX ADMIN — HEPARIN SODIUM SCH UNIT: 5000 INJECTION, SOLUTION INTRAVENOUS; SUBCUTANEOUS at 22:45

## 2017-08-02 RX ADMIN — METRONIDAZOLE SCH MLS/HR: 500 INJECTION, SOLUTION INTRAVENOUS at 18:11

## 2017-08-02 RX ADMIN — MORPHINE SULFATE PRN MG: 4 INJECTION, SOLUTION INTRAMUSCULAR; INTRAVENOUS at 14:58

## 2017-08-02 RX ADMIN — MORPHINE SULFATE PRN MG: 4 INJECTION, SOLUTION INTRAMUSCULAR; INTRAVENOUS at 23:53

## 2017-08-02 RX ADMIN — METRONIDAZOLE SCH MLS/HR: 500 INJECTION, SOLUTION INTRAVENOUS at 10:45

## 2017-08-02 RX ADMIN — SODIUM CHLORIDE SCH MLS/HR: 9 INJECTION, SOLUTION INTRAVENOUS at 23:52

## 2017-08-02 RX ADMIN — SODIUM CHLORIDE SCH MLS/HR: 9 INJECTION, SOLUTION INTRAVENOUS at 08:41

## 2017-08-02 NOTE — PN
Physical Exam: 


SUBJECTIVE: Patient seen and examined at bedside. Number of loose BMs now 2-3 (

decreased frequency), still with blood. Denies abdominal or chest pain, SOB. Pt 

still feeling nauseous. 








OBJECTIVE:





 Vital Signs











 Period  Temp  Pulse  Resp  BP Sys/Hung  Pulse Ox


 


 Last 24 Hr  98.1 F-101.2 F    16-18  102-113/58-65  98-98











GENERAL: The patient is awake, alert, and fully oriented, in no acute distress.


HEAD: Normal with no signs of trauma.


EYES: PERRL, extraocular movements intact, sclera anicteric, conjunctiva clear. 


ENT: Ears normal, nares patent, oropharynx clear without exudates, moist mucous 


membranes.


NECK: Trachea midline, full range of motion, supple. 


LUNGS: Breath sounds equal, clear to auscultation bilaterally, no wheezes, no 

crackles, no 


accessory muscle use. 


HEART: Regular rate and rhythm, S1, S2 without murmur, rub or gallop.


ABDOMEN: mildly tender, mildly distended, normoactive bowel sounds, no guarding

, no 


rebound


EXTREMITIES: 2+ posterior tibial pulses, warm, well-perfused, no edema. 


NEUROLOGICAL: Cranial nerves II through XII grossly intact. 














 Laboratory Results - last 24 hr











  08/01/17 08/02/17 08/02/17





  18:30 07:40 07:40


 


WBC    9.5


 


RBC    3.27 L


 


Hgb    9.6 L


 


Hct    28.1 L


 


MCV    86.0


 


MCH    29.3


 


MCHC    34.0


 


RDW    16.4 H


 


Plt Count    243  D


 


MPV    9.1


 


Neutrophils %    68.6


 


Lymphocytes %    12.3  D


 


Monocytes %    16.7 H


 


Eosinophils %    2.3


 


Basophils %    0.1


 


ESR   


 


INR   


 


PTT (Actin FS)   


 


Sodium   143 


 


Potassium   3.1 L 


 


Chloride   109 H 


 


Carbon Dioxide   24 


 


Anion Gap   10 


 


BUN   4 L 


 


Creatinine   0.4 L 


 


Random Glucose   82 


 


Calcium   7.4 L 


 


Magnesium   2.1 


 


C-Reactive Protein   16.6 H 


 


Hepatitis C Antibody   


 


HIV 1&2 Antibody Screen  Negative  


 


HIV P24 Antigen  Negative  














  08/02/17 08/02/17 08/02/17





  07:40 07:40 07:40


 


WBC   


 


RBC   


 


Hgb   


 


Hct   


 


MCV   


 


MCH   


 


MCHC   


 


RDW   


 


Plt Count   


 


MPV   


 


Neutrophils %   


 


Lymphocytes %   


 


Monocytes %   


 


Eosinophils %   


 


Basophils %   


 


ESR    50 H


 


INR   1.66 H 


 


PTT (Actin FS)   29.2 


 


Sodium   


 


Potassium   


 


Chloride   


 


Carbon Dioxide   


 


Anion Gap   


 


BUN   


 


Creatinine   


 


Random Glucose   


 


Calcium   


 


Magnesium   


 


C-Reactive Protein   


 


Hepatitis C Antibody  Cancelled  


 


HIV 1&2 Antibody Screen   


 


HIV P24 Antigen   








Active Medications











Generic Name Dose Route Start Last Admin





  Trade Name Caity  PRN Reason Stop Dose Admin


 


Acetaminophen  650 mg 07/30/17 09:52 08/01/17 01:13





  Tylenol -  PO   650 mg





  Q4H PRN   Administration





  FEVER OR PAIN   


 


Ceftriaxone Sodium  2 gm 08/01/17 17:30 08/02/17 10:45





  Rocephin 2gm Ivpb (Pre-Docked)  IVPB   2 gm





  DAILY NORM   Administration





  Protocol   


 


Heparin Sodium (Porcine)  5,000 unit 07/30/17 10:00 08/02/17 10:45





  Heparin -  SQ   5,000 unit





  BID NORM   Administration


 


Sodium Chloride  1,000 mls @ 150 mls/hr 07/30/17 09:00 08/02/17 16:59





  Normal Saline -  IV   150 mls/hr





  ASDIR NORM   Administration


 


Metronidazole  100 mls @ 100 mls/hr 08/01/17 18:00 08/02/17 10:45





  Flagyl 500mg Premixed Ivpb -  IVPB   100 mls/hr





  Q8H-IV NORM   Administration


 


Miconazole Nitrate  1 applic 08/02/17 22:00  





  Monistat-7 Vaginal Cream -  VG 08/08/17 22:01  





  HS NORM   


 


Morphine Sulfate  2 mg 07/31/17 10:26 08/02/17 14:58





  Morphine Injection -  IVPUSH   2 mg





  Q4H PRN   Administration





  PAIN   


 


Ondansetron HCl  4 mg 08/02/17 14:32 08/02/17 15:57





  Zofran Injection  IVPUSH 08/03/17 02:33  4 mg





  Q4H PRN   Administration





  NAUSEA AND/OR VOMITING   











ASSESSMENT/PLAN:





20 y/o F with no significant PMH presents to ED with 1 week of diarrhea. Pt 

admitted for sepsis secondary to possible infectious colitis.





# Sepsis secondary to possible IBD


-infectious cause possibly less likely as symptoms have been persisting, 

possible flare? IBD?


-C.diff toxin negative


-Continue Levaquin 500mg IVPB daily, Flagyl 500mg PO q8H (Today is Day4)


-F/u CRP


-F/u stool cx from [facility], shiga E.coli stool study, IBD serologies, HIV, 

HCV, ova and parasite testing, rotavirus, norovirus panels


-Pt for Flex Sig


-GI on case, Dr. Go





2. asymptomatic, +leukocyte esterase on  UA


-Urine cx: no growth





3. BRBPR -resolved


-Continue to monitor H&H, CBC





F/E/N


-IV  cc/hr


-monitor electrolytes


-clear liquids





Visit type





- Emergency Visit


Emergency Visit: No





- New Patient


This patient is new to me today: No





- Critical Care


Critical Care patient: No

## 2017-08-02 NOTE — PN
GI Progress Note


Subjective: 


Had 1 diarrheal BM today. Temp 101.2 reported overnight


Overall Ms. Cristina in states feeling better. 








- Objective


Vital Signs: 


 Vital Signs











Temperature  98.1 F   08/02/17 10:00


 


Pulse Rate  96 H  08/02/17 10:00


 


Respiratory Rate  18   08/02/17 10:00


 


Blood Pressure  110/65   08/02/17 10:00


 


O2 Sat by Pulse Oximetry (%)  98   08/02/17 09:00











Constitutional: Calm


Eyes: No: Sclera Icterus


Cardiovascular: Yes: Regular Rate and Rhythm.  No: Murmur


Respiratory: Yes: CTA Bilaterally


Gastrointestinal Inspection: No: Distention


...Auscultate: Yes: Normoactive Bowel Sounds


...Palpate: Yes: Tenderness (Improved from yesterday's exam).  No: Guarding, 

Tenderness, Rebound


...Percussion: No: Tympanitic


Edema: No


Labs: 


 CBC, BMP





 08/02/17 07:40 





 08/02/17 07:40 





 INR, PTT











INR  1.66  (0.82-1.09)  H  08/02/17  07:40    








 








  08/02/17





  07:40


 


Atypical p-ANCA  Pending


 


Hepatitis A Ab Total  Pending


 


Hep Bs Antigen  Pending


 


Hep Bs Antibody  Pending


 


Hep B Core Total Ab  Pending


 


Hepatitis C Antibody  Pending


 


S.cerevisiae IgG Ab  Pending


 


S. cerevisiae IgG/IgA  Pending














Problem List





- Problems


(1) Colitis


Assessment/Plan: 


? infectious vs. IBD flare


Further serologic/stool work-up in progress


Will likely have flex sig this week. Discussed with patient and her mother


Abx per ID


Follow-up stool studies, hepatitis serologies, IBD serologies


Given higher risk of thromboembolic events, would continue A/C DVT prophylaxis 

as long as H/H remains stable


persistent elevated INR. Unclear etiology. Consider heme eval


Other recommendations as outlined in yesterday's consultation


Code(s): K52.9 - NONINFECTIVE GASTROENTERITIS AND COLITIS, UNSPECIFIED

## 2017-08-02 NOTE — PN
Teaching Attending Note


Name of Resident: Vicenta Elam


ATTENDING PHYSICIAN STATEMENT





I saw and evaluated the patient.


I reviewed the resident's note and discussed the case with the resident.


I agree with the resident's findings and plan as documented.








SUBJECTIVE: Diarrhea is improving. She complains of nausea.








OBJECTIVE:


 Vital Signs











 Period  Temp  Pulse  Resp  BP Sys/Hung  Pulse Ox


 


 Last 24 Hr  98.1 F-101.2 F    16-18  102-113/58-65  98-98








HEART: S1S2, tachycardic


LUNGS: Clear


ABDOMEN: Soft, non-distended, mild diffuse tenderness, normal BS


EXTREMITIES: No edema








ASSESSMENT AND PLAN:


This is a 21-year-old woman wtih no medical history who presented to the ER 

with bloody diarrhea.





1. SIRS, possible sepsis secondary to infectious colitis


   - Pain and diarrhea are improving 


   - Continue Levaquin, Flagyl


   - Zofran as needed for nausea


   - Plan for flexible sigmoidoscopy


2. Acute blood loss anemia


   - Hemoglobin stable

## 2017-08-02 NOTE — PN
Teaching Attending Note


Name of Resident: Ida Bernal


ATTENDING PHYSICIAN STATEMENT





I saw and evaluated the patient.


I reviewed the resident's note and discussed the case with the resident.


I agree with the resident's findings and plan as documented.








SUBJECTIVE:


feels somewhat improved


stools are bloody again


still intermittent fevers











OBJECTIVE:


 Vital Signs











 Period  Temp  Pulse  Resp  BP Sys/Hung  Pulse Ox


 


 Last 24 Hr  98.1 F-101.2 F    18-18  102-113/61-65  98-98








cor-rrr


lungs clear


abd siligthly distended, +BS, less tender to palpation


ext no edema





 CBC, BMP





 08/02/17 07:40 





 08/02/17 07:40 





 Microbiology





08/01/17 11:30   Blood - Peripheral Venous   Blood Culture - Preliminary


                            NO GROWTH OBTAINED AFTER 24 HOURS, INCUBATION TO 

CONTINUE


                            FOR 4 DAYS.


08/01/17 11:42   Blood - Peripheral Venous   Blood Culture - Preliminary


                            NO GROWTH OBTAINED AFTER 24 HOURS, INCUBATION TO 

CONTINUE


                            FOR 4 DAYS.


08/01/17 17:30   Stool   Norovirus GI - Preliminary


08/01/17 17:30   Stool   Norovirus GII - Preliminary


08/01/17 17:30   Stool   Shiga Toxin Test - Preliminary


07/30/17 03:59   Blood - Peripheral Venous   Blood Culture - Preliminary


                            NO GROWTH OBTAINED AFTER 72 HOURS, INCUBATION TO 

CONTINUE


                            FOR 2 DAYS.


07/30/17 03:59   Blood - Peripheral Venous   Blood Culture - Preliminary


                            NO GROWTH OBTAINED AFTER 72 HOURS, INCUBATION TO 

CONTINUE


                            FOR 2 DAYS.


07/30/17 15:07   Stool   Gram Stain - Final


07/30/17 15:07   Stool   Salmonella/Shigella Culture - Final


                            NO GROWTH OF SALMONELLA OR SHIGELLA SPECIES OBTAINED


07/30/17 15:07   Stool   Campylobacter Culture - Final


                            NO GROWTH OF CAMPYLOBACTER SPECIES OBTAINED


07/30/17 15:07   Stool   Yersinia Culture - Final


                            NO GROWTH OF YERSINIA SPECIES OBTAINED


07/30/17 15:07   Stool   Vibrio Culture - Final


                            NO GROWTH OF VIBRIO SPECIES OBTAINED


07/30/17 15:07   Stool   Escherichia coli 0157 Culture - Final


                            NO GROWTH OF E COLI 0157 OBTAINED


07/31/17 16:00   Stool   Clostridium difficile Antigen (ZIA) - Final


07/31/17 16:00   Stool   Clostridium difficile Toxin Assay - Final


07/30/17 17:39   Urine - Urine Clean Catch   Urine Culture - Final


                            NO GROWTH OBTAINED











ASSESSMENT AND PLAN:


work up in progress


pan colitis


suspect will need flex sig


continue rocephin/flagyl

## 2017-08-02 NOTE — PN
Physical Exam: 


24 Hour Events: overnight - fever to 101.2





SUBJECTIVE: feeling better. bloody diarrhea last night. no n/v.











OBJECTIVE:





 Vital Signs











 Period  Temp  Pulse  Resp  BP Sys/Hung  Pulse Ox


 


 Last 24 Hr  98.1 F-101.2 F    17-18  102-113/61-65  98-98











GENERAL: The patient is awake, alert, and fully oriented, in no acute distress.


HEAD: Normal with no signs of trauma.


EYES: PERRLA, EOMI, sclera anicteric, conjunctiva clear


ENT: Oropharynx clear without exudates, moist mucous membranes.


LUNGS: CTAB, no wheezes, no crackles, no accessory muscle use. 


HEART: rrr, normal S1/S2 without murmur, rub or gallop.


ABDOMEN: Soft, tender to deep palpation in lower quadrants, bowel sounds


SKIN: Warm, dry, normal turgor, no rashes or lesions noted














 Laboratory Results - last 24 hr











  08/01/17 08/02/17 08/02/17





  18:30 07:40 07:40


 


WBC    9.5


 


RBC    3.27 L


 


Hgb    9.6 L


 


Hct    28.1 L


 


MCV    86.0


 


MCH    29.3


 


MCHC    34.0


 


RDW    16.4 H


 


Plt Count    243  D


 


MPV    9.1


 


Neutrophils %    68.6


 


Lymphocytes %    12.3  D


 


Monocytes %    16.7 H


 


Eosinophils %    2.3


 


Basophils %    0.1


 


ESR   


 


INR   


 


PTT (Actin FS)   


 


Sodium   143 


 


Potassium   3.1 L 


 


Chloride   109 H 


 


Carbon Dioxide   24 


 


Anion Gap   10 


 


BUN   4 L 


 


Creatinine   0.4 L 


 


Random Glucose   82 


 


Calcium   7.4 L 


 


Magnesium   2.1 


 


C-Reactive Protein   16.6 H 


 


Hepatitis C Antibody   


 


HIV 1&2 Antibody Screen  Negative  


 


HIV P24 Antigen  Negative  














  08/02/17 08/02/17 08/02/17





  07:40 07:40 07:40


 


WBC   


 


RBC   


 


Hgb   


 


Hct   


 


MCV   


 


MCH   


 


MCHC   


 


RDW   


 


Plt Count   


 


MPV   


 


Neutrophils %   


 


Lymphocytes %   


 


Monocytes %   


 


Eosinophils %   


 


Basophils %   


 


ESR    50 H


 


INR   1.66 H 


 


PTT (Actin FS)   29.2 


 


Sodium   


 


Potassium   


 


Chloride   


 


Carbon Dioxide   


 


Anion Gap   


 


BUN   


 


Creatinine   


 


Random Glucose   


 


Calcium   


 


Magnesium   


 


C-Reactive Protein   


 


Hepatitis C Antibody  Cancelled  


 


HIV 1&2 Antibody Screen   


 


HIV P24 Antigen   











 Microbiology





08/01/17 11:30   Blood - Peripheral Venous   Blood Culture - Preliminary


                            NO GROWTH OBTAINED AFTER 24 HOURS, INCUBATION TO 

CONTINUE


                            FOR 4 DAYS.


08/01/17 11:42   Blood - Peripheral Venous   Blood Culture - Preliminary


                            NO GROWTH OBTAINED AFTER 24 HOURS, INCUBATION TO 

CONTINUE


                            FOR 4 DAYS.


08/01/17 17:30   Stool   Norovirus GI - Preliminary


08/01/17 17:30   Stool   Norovirus GII - Preliminary


08/01/17 17:30   Stool   Shiga Toxin Test - Preliminary


07/30/17 03:59   Blood - Peripheral Venous   Blood Culture - Preliminary


                            NO GROWTH OBTAINED AFTER 72 HOURS, INCUBATION TO 

CONTINUE


                            FOR 2 DAYS.


07/30/17 03:59   Blood - Peripheral Venous   Blood Culture - Preliminary


                            NO GROWTH OBTAINED AFTER 72 HOURS, INCUBATION TO 

CONTINUE


                            FOR 2 DAYS.


07/30/17 15:07   Stool   Gram Stain - Final


07/30/17 15:07   Stool   Salmonella/Shigella Culture - Final


                            NO GROWTH OF SALMONELLA OR SHIGELLA SPECIES OBTAINED


07/30/17 15:07   Stool   Campylobacter Culture - Final


                            NO GROWTH OF CAMPYLOBACTER SPECIES OBTAINED


07/30/17 15:07   Stool   Yersinia Culture - Final


                            NO GROWTH OF YERSINIA SPECIES OBTAINED


07/30/17 15:07   Stool   Vibrio Culture - Final


                            NO GROWTH OF VIBRIO SPECIES OBTAINED


07/30/17 15:07   Stool   Escherichia coli 0157 Culture - Final


                            NO GROWTH OF E COLI 0157 OBTAINED


07/31/17 16:00   Stool   Clostridium difficile Antigen (ZIA) - Final


07/31/17 16:00   Stool   Clostridium difficile Toxin Assay - Final


07/30/17 17:39   Urine - Urine Clean Catch   Urine Culture - Final


                            NO GROWTH OBTAINED














Active Medications





Acetaminophen (Tylenol -)  650 mg PO Q4H PRN


   PRN Reason: FEVER OR PAIN


   Last Admin: 08/01/17 01:13 Dose:  650 mg


Acetaminophen (Ofirmev Injection -)  1,000 mg IVPB Q6H PRN


   PRN Reason: FEVER OR PAIN


   Stop: 08/02/17 16:23


   Last Admin: 08/01/17 23:44 Dose:  1,000 mg


Ceftriaxone Sodium (Rocephin 2gm Ivpb (Pre-Docked))  2 gm IVPB DAILY [facility]


   PRN Reason: Protocol


   Last Admin: 08/02/17 10:45 Dose:  2 gm


Heparin Sodium (Porcine) (Heparin -)  5,000 unit SQ BID [facility]


   Last Admin: 08/02/17 10:45 Dose:  5,000 unit


Sodium Chloride (Normal Saline -)  1,000 mls @ 150 mls/hr IV ASDIR [facility]


   Last Admin: 08/02/17 08:41 Dose:  150 mls/hr


Metronidazole (Flagyl 500mg Premixed Ivpb -)  100 mls @ 100 mls/hr IVPB Q8H-IV 

NORM


   Last Admin: 08/02/17 10:45 Dose:  100 mls/hr


Miconazole Nitrate (Monistat-7 Vaginal Cream -)  1 applic VG HS NORM


   Stop: 08/08/17 22:01


Morphine Sulfate (Morphine Injection -)  2 mg IVPUSH Q4H PRN


   PRN Reason: PAIN


   Last Admin: 08/02/17 14:58 Dose:  2 mg


Ondansetron HCl (Zofran Injection)  4 mg IVPUSH Q4H PRN


   PRN Reason: NAUSEA AND/OR VOMITING


   Stop: 08/03/17 02:33


   Last Admin: 08/02/17 15:57 Dose:  4 mg








ASSESSMENT/PLAN:





20yo young woman with c/o abdominal pain, fever, and bloody diarrhea who has pan

-colitis of unclear etiology.





#Colitis: inflammatory vs infectious etiology


-continue metronidazole and ceftriaxone


-f/u stool O&P, HIV, stool viral panel studies


-obtain medical records [facility]


-GI to flex sig








d/w Dr. Mayes


Will continue to follow.





MARGAUX ORTIZ MD


PGY-1





Visit type





- Emergency Visit


Emergency Visit: No





- New Patient


This patient is new to me today: Yes


Date on this admission: 08/02/17





- Critical Care


Critical Care patient: No

## 2017-08-02 NOTE — PN
Progress Note (short form)





- Note


Progress Note: 


Attending Surgeon





No c/o; less diarrhea; more formed stools; tolerating clear liquids





VSS AF had t max 101.2





abdomen-soft; non distended; diffusely superficially tender to palpation





WBSC wnl





IMP: colotis





PLAN: Continue as per primary care team and consultants.





Ollie Guerrero MD FACS

## 2017-08-03 LAB
ALBUMIN SERPL-MCNC: 1.8 G/DL (ref 3.4–5)
ALP SERPL-CCNC: 33 U/L (ref 45–117)
ALT SERPL-CCNC: 12 U/L (ref 12–78)
ANION GAP SERPL CALC-SCNC: 8 MMOL/L (ref 8–16)
AST SERPL-CCNC: 23 U/L (ref 15–37)
BASOPHILS # BLD: 0.4 % (ref 0–2)
BILIRUB CONJ SERPL-MCNC: 0.1 MG/DL (ref 0–0.2)
BILIRUB SERPL-MCNC: 0.2 MG/DL (ref 0.2–1)
CALCIUM SERPL-MCNC: 7.4 MG/DL (ref 8.5–10.1)
CO2 SERPL-SCNC: 24 MMOL/L (ref 21–32)
CREAT SERPL-MCNC: 0.4 MG/DL (ref 0.55–1.02)
DEPRECATED RDW RBC AUTO: 15.7 % (ref 11.6–15.6)
EOSINOPHIL # BLD: 2.3 % (ref 0–4.5)
GLUCOSE SERPL-MCNC: 90 MG/DL (ref 74–106)
MAGNESIUM SERPL-MCNC: 1.9 MG/DL (ref 1.8–2.4)
MCH RBC QN AUTO: 29 PG (ref 25.7–33.7)
MCHC RBC AUTO-ENTMCNC: 34 G/DL (ref 32–36)
MCV RBC: 85.4 FL (ref 80–96)
NEUTROPHILS # BLD: 67.8 % (ref 42.8–82.8)
PHOSPHATE SERPL-MCNC: 2.7 MG/DL (ref 2.5–4.9)
PLATELET # BLD AUTO: 237 K/MM3 (ref 134–434)
PMV BLD: 9.4 FL (ref 7.5–11.1)
PROT SERPL-MCNC: 5.1 G/DL (ref 6.4–8.2)
WBC # BLD AUTO: 8.7 K/MM3 (ref 4–10)

## 2017-08-03 RX ADMIN — POTASSIUM CHLORIDE AND SODIUM CHLORIDE SCH MLS/HR: 900; 150 INJECTION, SOLUTION INTRAVENOUS at 17:32

## 2017-08-03 RX ADMIN — CEFTRIAXONE SODIUM SCH GM: 2 INJECTION, POWDER, FOR SOLUTION INTRAMUSCULAR; INTRAVENOUS at 09:04

## 2017-08-03 RX ADMIN — MORPHINE SULFATE PRN MG: 4 INJECTION, SOLUTION INTRAMUSCULAR; INTRAVENOUS at 17:30

## 2017-08-03 RX ADMIN — SODIUM CHLORIDE SCH MLS/HR: 9 INJECTION, SOLUTION INTRAVENOUS at 08:16

## 2017-08-03 RX ADMIN — MORPHINE SULFATE PRN MG: 4 INJECTION, SOLUTION INTRAMUSCULAR; INTRAVENOUS at 12:39

## 2017-08-03 RX ADMIN — METRONIDAZOLE SCH MLS/HR: 500 INJECTION, SOLUTION INTRAVENOUS at 02:22

## 2017-08-03 RX ADMIN — MORPHINE SULFATE PRN MG: 4 INJECTION, SOLUTION INTRAMUSCULAR; INTRAVENOUS at 07:31

## 2017-08-03 RX ADMIN — HEPARIN SODIUM SCH: 5000 INJECTION, SOLUTION INTRAVENOUS; SUBCUTANEOUS at 22:42

## 2017-08-03 RX ADMIN — METRONIDAZOLE SCH MLS/HR: 500 INJECTION, SOLUTION INTRAVENOUS at 10:20

## 2017-08-03 RX ADMIN — POTASSIUM CHLORIDE AND SODIUM CHLORIDE SCH MLS/HR: 900; 150 INJECTION, SOLUTION INTRAVENOUS at 08:45

## 2017-08-03 RX ADMIN — HEPARIN SODIUM SCH UNIT: 5000 INJECTION, SOLUTION INTRAVENOUS; SUBCUTANEOUS at 10:21

## 2017-08-03 NOTE — PN
Teaching Attending Note


Name of Resident: Ida Bernal


ATTENDING PHYSICIAN STATEMENT





I saw and evaluated the patient.


I reviewed the resident's note and discussed the case with the resident.


I agree with the resident's findings and plan as documented.








SUBJECTIVE:Continues Ceftiaxone metronidazole Still report bloody stools








OBJECTIVE:








ASSESSMENT AND PLAN:


 Selected Entries











  08/03/17





  09:00


 


Temperature 99.1 F


 


Pulse Rate 96 H


 


Respiratory 18





Rate 


 


Blood Pressure 104/60








Microbiology





08/01/17 17:30   Stool   Salmonella/Shigella Culture - Preliminary


08/01/17 17:30   Stool   Escherichia coli 0157 Culture - Preliminary


                              Presumptive Mrsa (Pbp2a Pos)


                              NO ENTERIC PATHOGENS, 24 HOURS, ON PRIMARY PLATES





 Laboratory Tests











  08/02/17 08/02/17 08/02/17





  07:40 07:40 07:40


 


WBC   9.5 


 


Hgb   9.6 L 


 


Plt Count   243  D 


 


ESR    50 H


 


Potassium   


 


C-Reactive Protein  16.6 H  














  08/03/17 08/03/17





  06:15 06:15


 


WBC  


 


Hgb  


 


Plt Count  


 


ESR  


 


Potassium  2.9 L* 


 


C-Reactive Protein   11.1 H D








Assessment Colitis ? infectious vs IBD  CRP coming down





Plan                Continue  current antibiotic pending  endoscopy and 

consider stopping antibiotics


                        MRSA I assume contamination of specimen or colonized  

Needs isolation


Raleigh MILLER

## 2017-08-03 NOTE — PN
GI Progress Note


Subjective: 


No acute events


1 diarrheal bowel movement today, three yesterday


Her mother says that Kathryn gets nauseous when she doesn't receive morphine


low grade temps this morning


Abdominal cramping described as the same as yesterday


]





- Objective


Vital Signs: 


 Vital Signs











Temperature  99.9 F H  08/03/17 06:50


 


Pulse Rate  102 H  08/03/17 06:50


 


Respiratory Rate  18   08/03/17 06:50


 


Blood Pressure  106/63   08/03/17 06:50


 


O2 Sat by Pulse Oximetry (%)  98   08/02/17 21:00











Constitutional: Calm


Eyes: No: Sclera Icterus


Cardiovascular: Yes: Regular Rate and Rhythm


Respiratory: Yes: CTA Bilaterally


Gastrointestinal Inspection: Yes: Scars (decorative umbilical ring scar).  No: 

Distention


...Auscultate: Yes: Normoactive Bowel Sounds


...Palpate: Yes: Tenderness (mild TTP diffusely)


...Percussion: No: Tympanitic


Edema: No


Neurological: Yes: Alert, Oriented


Labs: 


 CBC, BMP





 08/03/17 06:15 





 08/03/17 06:15 





 INR, PTT











INR  1.66  (0.82-1.09)  H  08/02/17  07:40    








 Laboratory Tests











  08/02/17





  07:40


 


Atypical p-ANCA  Pending


 


S.cerevisiae IgG Ab  Pending


 


S. cerevisiae IgG/IgA  Pending








Microbiology





08/01/17 17:30   Stool   Shiga Toxin Test - Pending


08/01/17 17:30   Stool   Norovirus GI - Pending


08/01/17 17:30   Stool   Norovirus GII - Pending











Problem List





- Problems


(1) Colitis


Assessment/Plan: 


Persistent symptoms with initial stool studies being unrevealing


Plan for flex sig tomorrow. I discusswed this with Kathryn and her mother whoi 

was at bedside.  We discussed pootential risks oif the procedure like but not 

limited to bleeding, perforation requiring surgery to repair, infection and 

sedation medication effects all of which could be potentially life threatening.

  She has agreed to the procedure and consent was obtained


Advanced diet to low fiber


Hold AM dose of heparin


NPO after midnight


Replete electrolytes. Added magnesium to AM labs as well as phosphorous 


Code(s): K52.9 - NONINFECTIVE GASTROENTERITIS AND COLITIS, UNSPECIFIED

## 2017-08-03 NOTE — PN
Physical Exam: 


SUBJECTIVE: Patient seen and examined at bedside. Pt still has constant 

abdominal pain that is unchanged. 1 episode of emesis this morning. Still 

having loose bowel movements, but decreased in frequency and without blood. Pt 

for flex sig tomorrow.








OBJECTIVE:





 Vital Signs











 Period  Temp  Pulse  Resp  BP Sys/Hung  Pulse Ox


 


 Last 24 Hr  98.2 F-99.9 F    16-18  104-115/58-63  98-99











GENERAL: The patient is awake, alert, and fully oriented, in no acute distress.


HEAD: Normal with no signs of trauma.


EYES: PERRL, extraocular movements intact, sclera anicteric, conjunctiva clear. 


NECK: Trachea midline, full range of motion, supple. 


LUNGS: Breath sounds equal, clear to auscultation bilaterally, no wheezes, no 

crackles, no 


accessory muscle use. 


HEART: Regular rate and rhythm, S1, S2 without murmur, rub or gallop.


ABDOMEN: Soft, mildly tender and distended, normoactive bowel sounds, no 

guarding, no 


rebound


EXTREMITIES: 2+ posterior tibial pulses, warm, well-perfused, no edema. 


NEUROLOGICAL: Cranial nerves II through XII grossly intact. 














 Laboratory Results - last 24 hr











  08/02/17 08/03/17 08/03/17





  07:40 06:15 06:15


 


WBC   8.7 


 


RBC   3.07 L 


 


Hgb   8.9 L 


 


Hct   26.2 L 


 


MCV   85.4 


 


MCH   29.0 


 


MCHC   34.0 


 


RDW   15.7 H 


 


Plt Count   237 


 


MPV   9.4 


 


Neutrophils %   67.8 


 


Lymphocytes %   13.1 


 


Monocytes %   16.4 H 


 


Eosinophils %   2.3 


 


Basophils %   0.4  D 


 


Sodium    144


 


Potassium    2.9 L*


 


Chloride    112 H


 


Carbon Dioxide    24


 


Anion Gap    8


 


BUN    3 L D


 


Creatinine    0.4 L


 


Random Glucose    90


 


Calcium    7.4 L


 


Total Bilirubin    0.2  D


 


Direct Bilirubin    0.1


 


AST    23  D


 


ALT    12  D


 


Alkaline Phosphatase    33 L D


 


C-Reactive Protein   


 


Total Protein    5.1 L D


 


Albumin    1.8 L D


 


Hepatitis A Ab Total  Negative  


 


Hep Bs Antigen  Negative  


 


Hep Bs Antibody  Non reactive  


 


Hep B Core Total Ab  Negative  


 


Hepatitis C Antibody  0.2  














  08/03/17





  06:15


 


WBC 


 


RBC 


 


Hgb 


 


Hct 


 


MCV 


 


MCH 


 


MCHC 


 


RDW 


 


Plt Count 


 


MPV 


 


Neutrophils % 


 


Lymphocytes % 


 


Monocytes % 


 


Eosinophils % 


 


Basophils % 


 


Sodium 


 


Potassium 


 


Chloride 


 


Carbon Dioxide 


 


Anion Gap 


 


BUN 


 


Creatinine 


 


Random Glucose 


 


Calcium 


 


Total Bilirubin 


 


Direct Bilirubin 


 


AST 


 


ALT 


 


Alkaline Phosphatase 


 


C-Reactive Protein  11.1 H D


 


Total Protein 


 


Albumin 


 


Hepatitis A Ab Total 


 


Hep Bs Antigen 


 


Hep Bs Antibody 


 


Hep B Core Total Ab 


 


Hepatitis C Antibody 








Active Medications











Generic Name Dose Route Start Last Admin





  Trade Name Freq  PRN Reason Stop Dose Admin


 


Acetaminophen  650 mg 07/30/17 09:52 08/01/17 01:13





  Tylenol -  PO   650 mg





  Q4H PRN   Administration





  FEVER OR PAIN   


 


Ceftriaxone Sodium  2 gm 08/01/17 17:30 08/03/17 09:04





  Rocephin 2gm Ivpb (Pre-Docked)  IVPB   2 gm





  DAILY NORM   Administration





  Protocol   


 


Heparin Sodium (Porcine)  5,000 unit 07/30/17 10:00 08/03/17 10:21





  Heparin -  SQ   5,000 unit





  BID NORM   Administration


 


Metronidazole  100 mls @ 100 mls/hr 08/01/17 18:00 08/03/17 10:20





  Flagyl 500mg Premixed Ivpb -  IVPB   100 mls/hr





  Q8H-IV NORM   Administration


 


Potassium Chloride/Sodium Chloride  1,000 mls @ 150 mls/hr 08/03/17 08:33 08/03/ 17 08:45





  Ns+20 Meq Kcl -  IV   150 mls/hr





  ASDIR NORM   Administration


 


Miconazole Nitrate  1 applic 08/02/17 22:00 08/02/17 22:46





  Monistat-7 Vaginal Cream -  VG 08/08/17 22:01  1 applic





  HS NORM   Administration


 


Morphine Sulfate  2 mg 07/31/17 10:26 08/03/17 07:31





  Morphine Injection -  IVPUSH   2 mg





  Q4H PRN   Administration





  PAIN   











ASSESSMENT/PLAN:





20 y/o F with no significant PMH presents to ED with 1 week of diarrhea. Pt 

admitted for sepsis secondary to possible infectious colitis.





# Sepsis secondary to possible IBD


-IBD vs. infectious


-Flagyl Day 3, Rocephin Day 2


-CRP elevated, 11.1 


-HIV, HepB, HepC negative


-P-ANCA, stool ova and parasite - pending


-Pt for Flex Sig tomorrow


-Low fiber diet until midnight then NPO 


-Contacted [facility] medical records, request faxed over, will f/u


-GI on case, Dr. Go





#Hypokalemia


-K+ 2.9 this morning, most likely from emesis, diarrhea


-Repleted with NS+20mEq KCl 


-Will f/u BMP





#Vaginal itching


-Continue Monistat





#BRBPR-resolved


-Currently no active bleeding


-Continue to monitor H&H, CBC


-Last H&H: 8.9/26.2





#asymptomatic, +leukocyte esterase on  UA - resolved


-Urine cx: no growth























Visit type





- Emergency Visit


Emergency Visit: No





- New Patient


This patient is new to me today: No





- Critical Care


Critical Care patient: No

## 2017-08-03 NOTE — PN
Physical Exam: 


24hr events: 


Hepatitis A Ab neg


Hepatitis B Ag and Ab neg/non-reactive


Hep C Ab negative





Stool - MRSA +





SUBJECTIVE: Patient seen and examined. c/o abdominal cramps, 1x loose, non-

bloody BM this AM. No fevers or chills.








OBJECTIVE:





 Vital Signs











 Period  Temp  Pulse  Resp  BP Sys/Hung  Pulse Ox


 


 Last 24 Hr  98.1 F-99.9 F    16-18  106-115/58-65  98











GENERAL: The patient is awake, alert, and fully oriented, in no acute distress.


LUNGS: CTAB, no wheezes, crackles or rhonchi


HEART: Regular rate and rhythm, normal S1/S2 without murmur, rub or gallop.


ABDOMEN: Soft, tender to deep palpation in lower quadrants


LOWER EXTREMITIES: 2+ pulses, warm, well-perfused, no edema. 


SKIN: Warm, dry, normal turgor, no rashes or lesions noted








 CBC, BMP





 08/03/17 06:15 





 08/03/17 06:15 














 INR, PTT











INR  1.66  (0.82-1.09)  H  08/02/17  07:40    








 Laboratory Tests











  08/02/17





  07:40


 


Atypical p-ANCA  Pending


 


S.cerevisiae IgG Ab  Pending


 


S. cerevisiae IgG/IgA  Pending











 Laboratory Tests











  08/02/17 08/03/17





  07:40 06:15


 


ESR  50 H 


 


Potassium   2.9 L*

















Microbiology





08/01/17 17:30   Stool   Shiga Toxin Test - Pending


08/01/17 17:30   Stool   Norovirus GI - Pending


08/01/17 17:30   Stool   Norovirus GII - Pending





08/01/17 17:30   Stool   Salmonella/Shigella Culture - Preliminary


                            Presumptive Mrsa (Pbp2a Pos)


08/01/17 17:30   Stool   Yersinia Culture - Preliminary


                            NO ENTERIC PATHOGENS, 24 HOURS, ON PRIMARY PLATES


08/01/17 17:30   Stool   Vibrio Culture - Preliminary


                            NO ENTERIC PATHOGENS, 24 HOURS, ON PRIMARY PLATES


08/01/17 17:30   Stool   Escherichia coli 0157 Culture - Preliminary


                            NO ENTERIC PATHOGENS, 24 HOURS, ON PRIMARY PLATES


08/01/17 11:30   Blood - Peripheral Venous   Blood Culture - Preliminary


                            NO GROWTH OBTAINED AFTER 48 HOURS, INCUBATION TO 

CONTINUE


                            FOR 3 DAYS.


08/01/17 11:42   Blood - Peripheral Venous   Blood Culture - Preliminary


                            NO GROWTH OBTAINED AFTER 48 HOURS, INCUBATION TO 

CONTINUE


                            FOR 3 DAYS.


08/02/17 07:40   Blood - Peripheral Venous   TB Test (QFT) (ZIA) - Preliminary











Active Medications





Acetaminophen (Tylenol -)  650 mg PO Q4H PRN


   PRN Reason: FEVER OR PAIN


   Last Admin: 08/01/17 01:13 Dose:  650 mg


Ceftriaxone Sodium (Rocephin 2gm Ivpb (Pre-Docked))  2 gm IVPB DAILY NORM


   PRN Reason: Protocol


   Last Admin: 08/03/17 09:04 Dose:  2 gm


Heparin Sodium (Porcine) (Heparin -)  5,000 unit SQ BID NORM


   Last Admin: 08/02/17 22:45 Dose:  5,000 unit


Metronidazole (Flagyl 500mg Premixed Ivpb -)  100 mls @ 100 mls/hr IVPB Q8H-IV 

NORM


   Last Admin: 08/03/17 02:22 Dose:  100 mls/hr


Potassium Chloride/Sodium Chloride (Ns+20 Meq Kcl -)  1,000 mls @ 150 mls/hr IV 

ASDIR NORM


   Last Admin: 08/03/17 08:45 Dose:  150 mls/hr


Miconazole Nitrate (Monistat-7 Vaginal Cream -)  1 applic VG HS [facility]


   Stop: 08/08/17 22:01


   Last Admin: 08/02/17 22:46 Dose:  1 applic


Morphine Sulfate (Morphine Injection -)  2 mg IVPUSH Q4H PRN


   PRN Reason: PAIN


   Last Admin: 08/03/17 07:31 Dose:  2 mg














ASSESSMENT/PLAN:





20yo woman with c/o abdominal cramps, fever, and bloody diarrhea x 2weeks who 

has pan-colitis of unclear etiology. Based on duration of patient's symptoms 

and degree of abdominal pain/cramps that are unrelieved with morphine suggests 

infectious etiology less likely. Stool culture grew MRSA+, but this is unlikely 

to be cause of current symptoms; possibly contamination vs colonization.





#Colitis: inflammatory vs infectious etiology


-d/c metronidazole and ceftriaxone


-f/u stool O&P, norvo and rotavirus panel, shiga toxin


-Stool MRSA+ --> placed on isolation


-GI to flex sig 8/4








d/w Dr. Storey


Will continue to follow.





MARGAUX ORTIZ MD


PGY-1

















Visit type





- Emergency Visit


Emergency Visit: No





- New Patient


This patient is new to me today: No





- Critical Care


Critical Care patient: No

## 2017-08-03 NOTE — PN
Teaching Attending Note


Name of Resident: Vicenta Elam


ATTENDING PHYSICIAN STATEMENT





I saw and evaluated the patient.


I reviewed the resident's note and discussed the case with the resident.


I agree with the resident's findings and plan as documented.








SUBJECTIVE: Patient says she has some abdominal pain. She is still nauseous and 

she had non-bloody diarrhea this morning.








OBJECTIVE:


 Vital Signs











 Period  Temp  Pulse  Resp  BP Sys/Hung  Pulse Ox


 


 Last 24 Hr  98.2 F-99.9 F    16-18  104-115/58-63  98-99








HEART: S1S2, RRR


LUNGS: Clear


ABDOMEN: Mild diffuse tenderness


EXTREMITIES: No edema








ASSESSMENT AND PLAN:


This is a 21-year-old woman wtih no medical history who presented to the ER 

with bloody diarrhea.





1. SIRS, possible sepsis secondary to infectious colitis


   - C-RP 16.6 -> 11.1


   - Continue Levaquin, Flagyl


   - Zofran as needed for nausea


   - Plan for flexible sigmoidoscopy tomorrow


2. Acute blood loss anemia


   - Continue to monitor hemoglobin

## 2017-08-04 LAB
INR BLD: 1.53 (ref 0.82–1.09)
PT PNL PPP: 17 SEC (ref 9.98–11.88)

## 2017-08-04 PROCEDURE — 0DBQ8ZX EXCISION OF ANUS, VIA NATURAL OR ARTIFICIAL OPENING ENDOSCOPIC, DIAGNOSTIC: ICD-10-PCS | Performed by: INTERNAL MEDICINE

## 2017-08-04 RX ADMIN — POTASSIUM CHLORIDE AND SODIUM CHLORIDE SCH MLS/HR: 900; 150 INJECTION, SOLUTION INTRAVENOUS at 18:30

## 2017-08-04 RX ADMIN — HEPARIN SODIUM SCH: 5000 INJECTION, SOLUTION INTRAVENOUS; SUBCUTANEOUS at 21:28

## 2017-08-04 RX ADMIN — HYDROCORTISONE SODIUM SUCCINATE SCH MG: 100 INJECTION, POWDER, FOR SOLUTION INTRAMUSCULAR; INTRAVENOUS at 12:33

## 2017-08-04 RX ADMIN — MORPHINE SULFATE PRN MG: 4 INJECTION, SOLUTION INTRAMUSCULAR; INTRAVENOUS at 00:51

## 2017-08-04 RX ADMIN — HYDROCORTISONE SODIUM SUCCINATE SCH MG: 100 INJECTION, POWDER, FOR SOLUTION INTRAMUSCULAR; INTRAVENOUS at 21:15

## 2017-08-04 RX ADMIN — MORPHINE SULFATE PRN MG: 4 INJECTION, SOLUTION INTRAMUSCULAR; INTRAVENOUS at 08:18

## 2017-08-04 RX ADMIN — POTASSIUM CHLORIDE AND SODIUM CHLORIDE SCH MLS/HR: 900; 150 INJECTION, SOLUTION INTRAVENOUS at 07:26

## 2017-08-04 RX ADMIN — POTASSIUM CHLORIDE AND SODIUM CHLORIDE SCH MLS/HR: 900; 150 INJECTION, SOLUTION INTRAVENOUS at 00:16

## 2017-08-04 RX ADMIN — MESALAMINE SCH MG: 800 TABLET, DELAYED RELEASE ORAL at 21:27

## 2017-08-04 RX ADMIN — MICONAZOLE NITRATE SCH: 20 CREAM VAGINAL at 21:30

## 2017-08-04 RX ADMIN — MESALAMINE SCH MG: 800 TABLET, DELAYED RELEASE ORAL at 14:52

## 2017-08-04 RX ADMIN — MORPHINE SULFATE PRN MG: 4 INJECTION, SOLUTION INTRAMUSCULAR; INTRAVENOUS at 16:44

## 2017-08-04 RX ADMIN — HYDROCORTISONE SCH MG: 100 SUSPENSION RECTAL at 22:51

## 2017-08-04 RX ADMIN — MICONAZOLE NITRATE SCH: 20 CREAM VAGINAL at 00:03

## 2017-08-04 NOTE — PN
Progress Note (short form)





- Note


Progress Note: 


Attending Surgeon





Much the same; some c/o abdo pain and diarrhea





VSS AF





abdomen-soft; minimally tender; improved from original exam





IMP: colitis of ? origin





PLAN: Continue as per consultants/primary  care team; reconsult as necessary.





Ollie Guerrero MD FACS

## 2017-08-04 NOTE — PN
Progress Note (short form)





- Note


Progress Note: 


ID











Currently off antibiotics  Discusses with Dr Zarco  endoscopy shows severe 

ulcerative colitis





Low grade temps


Microbiology





08/01/17 17:30   Stool   Salmonella/Shigella Culture - Preliminary


08/01/17 17:30   Stool   Escherichia coli 0157 Culture - Preliminary


                              Mr S Aureus


                              NO ENTERIC PATHOGENS, 24 HOURS, ON PRIMARY PLATES





 Laboratory Tests











  08/03/17 08/03/17





  06:15 06:15


 


WBC  8.7 


 


Hgb  8.9 L 


 


Plt Count  237 


 


BUN   3 L D


 


Creatinine   0.4 L








Assessment    Preliminary diagnosis diagnosis








Plan                  Started on rectal and systemic corticosteroids





                          Kindly recall if any questions





Raleigh MILLER

## 2017-08-04 NOTE — PN
Physical Exam: 


SUBJECTIVE: Patient seen and examined at bedside this morning, before flex sig. 

Overnight pt had 100.7F fever and was given morphine for pain control. Pt 

states that this morning, she is still having abdominal discomfort and loose 

bowel movements, with blood. Denies chest pain, or SOB. Given enema to prepare 

for procedure.








OBJECTIVE:





 Vital Signs











 Period  Temp  Pulse  Resp  BP Sys/Hung  Pulse Ox


 


 Last 24 Hr  98.3 F-100.8 F    16-20  /53-71  











GENERAL: The patient is awake, alert, and fully oriented, in no acute distress.


HEAD: Normal with no signs of trauma.


EYES: PERRL, extraocular movements intact, sclera anicteric, conjunctiva clear. 


ENT: Ears normal, nares patent, oropharynx clear without exudates, moist mucous 


membranes.


NECK: Trachea midline, full range of motion, supple. 


LUNGS: Breath sounds equal, clear to auscultation bilaterally, no wheezes, no 

crackles, no 


accessory muscle use. 


HEART: Regular rate and rhythm, S1, S2 without murmur, rub or gallop.


ABDOMEN: Soft, nondistended, diffusely tender, normoactive bowel sounds, no 

guarding, no 


rebound, no hepatosplenomegaly, no masses.


EXTREMITIES: 2+ posterior tibial pulses, warm, well-perfused, no edema. 


NEUROLOGICAL: Cranial nerves II through XII grossly intact. 














 Laboratory Results - last 24 hr











  08/01/17 08/01/17 08/04/17





  17:30 17:30 06:58


 


INR    1.53 H


 


Rotavirus Report Status   Negative 


 


Stool O & P Wet [place]    


 


O & P Permanent Slide  Final report  








Active Medications











Generic Name Dose Route Start Last Admin





  Trade Name Freq  PRN Reason Stop Dose Admin


 


Acetaminophen  650 mg 07/30/17 09:52 08/01/17 01:13





  Tylenol -  PO   650 mg





  Q4H PRN   Administration





  FEVER OR PAIN   


 


Heparin Sodium (Porcine)  5,000 unit 07/30/17 10:00 08/03/17 22:42





  Heparin -  SQ   Not Given





  BID NORM   


 


Hydrocortisone  60 mg 08/04/17 22:00  





  Cortenema -  RC   





  HS NORM   


 


Hydrocortisone Sodium Succinate  100 mg 08/04/17 12:00 08/04/17 12:33





  Solu-Cortef -  IVPB   100 mg





  Q8H NORM   Administration


 


Potassium Chloride/Sodium Chloride  1,000 mls @ 150 mls/hr 08/03/17 08:33 08/04/ 17 07:26





  Ns+20 Meq Kcl -  IV   150 mls/hr





  ASDIR NORM   Administration


 


Mesalamine  800 mg 08/04/17 14:00  





  Asacol Hd -  PO   





  TID NORM   


 


Miconazole Nitrate  1 applic 08/02/17 22:00 08/04/17 00:03





  Monistat-7 Vaginal Cream -  VG 08/08/17 22:01  Not Given





  HS [facility]   


 


Morphine Sulfate  2 mg 07/31/17 10:26 08/04/17 08:18





  Morphine Injection -  IVPUSH   2 mg





  Q4H PRN   Administration





  PAIN   











ASSESSMENT/PLAN:





This is a 20 y/o F with no significant PMH who presented to the ED with 1 week 

of bloody diarrhea. Pt admitted for sepsis secondary to ulcerative colitis.





# Sepsis secondary to ulcerative colitis


-Flex sig result consistent with ulcerative colitis


-Pt started on Asacol (mesalamine) 800 mg PO TID, Solucortef 100mg IVPB q8h, 

Cortenema 60mg RC HS (Today Day1)- until biopsies read


-Abx d/c as most likely not infectious cause


-GI on board


-Pain control: morphine 2mg IVP q4 PRN


-Zofran PRN for nausea





#Hypokalemia


-Will f/u next BMP 





#Vaginal itching


-Continue Monistat 1 application PRN when symptomatic





#BRBPR


-Most likely d/t Ulcerative colitis


-Will monitor as pt begins treatment on Asacol, Solucortef, Cortenema


-Continue to monitor H&H, CBC





#asymptomatic, +leukocyte esterase on  UA - resolved


-Urine cx: no growth





DVT Prophylaxis


Heparin 5000 U SQ BID





F/E/N


-NS+ 20 mEq Kcl


-Monitor electrolytes, especially potassium


-Clear liquid diet





Disposition


-begin d/c planning once pt active bleeding BRBPR is controlled


-once pt not symptomatic








Visit type





- Emergency Visit


Emergency Visit: No





- New Patient


This patient is new to me today: No





- Critical Care


Critical Care patient: No

## 2017-08-04 NOTE — PN
Progress Note (short form)





- Note


Progress Note: 


GI Procedure NOte: Please see scanned sigmoidoscopy report. Picture is most 

consistent with ulcerative colitis. I discused this illness with the patient 

and her mother including the need for steroids initially and Imuran longer 

term. I discussed the adverse effects of steroids and the risks of lymphoma 

with Imuran therapy. I will start SoluCortef, cortenemas and Mesalamine for now 

until biopsies are read.

## 2017-08-04 NOTE — PN
Teaching Attending Note


Name of Resident: Vicenta Elam


ATTENDING PHYSICIAN STATEMENT





I saw and evaluated the patient.


I reviewed the resident's note and discussed the case with the resident.


I agree with the resident's findings and plan as documented.








SUBJECTIVE: Patient continues to have abdominal pain and diarrhea.








OBJECTIVE:


 Vital Signs











 Period  Temp  Pulse  Resp  BP Sys/Hung  Pulse Ox


 


 Last 24 Hr  98.3 F-100.8 F    16-20  /53-71  








HEART: S1S2, RRR


LUNGS: Clear


ABDOMEN: Mild diffuse tenderness


EXTREMITIES: No edema





 Current Medications











Generic Name Dose Route Start Last Admin





  Trade Name Freq  PRN Reason Stop Dose Admin


 


Acetaminophen  650 mg 07/30/17 09:52 08/01/17 01:13





  Tylenol -  PO   650 mg





  Q4H PRN   Administration





  FEVER OR PAIN   


 


Heparin Sodium (Porcine)  5,000 unit 08/04/17 22:00  





  Heparin -  SQ   





  BID NORM   


 


Hydrocortisone  100 mg 08/04/17 22:00  





  Cortenema -  RC   





  HS NORM   


 


Hydrocortisone Sodium Succinate  100 mg 08/04/17 12:00 08/04/17 12:33





  Solu-Cortef -  IVPB   100 mg





  Q8H NORM   Administration


 


Potassium Chloride/Sodium Chloride  1,000 mls @ 150 mls/hr 08/03/17 08:33 08/04/ 17 07:26





  Ns+20 Meq Kcl -  IV   150 mls/hr





  ASDIR NORM   Administration


 


Mesalamine  800 mg 08/04/17 14:00 08/04/17 14:52





  Asacol Hd -  PO   800 mg





  TID NORM   Administration


 


Miconazole Nitrate  1 applic 08/02/17 22:00 08/04/17 00:03





  Monistat-7 Vaginal Cream -  VG 08/08/17 22:01  Not Given





  HS NORM   


 


Morphine Sulfate  2 mg 07/31/17 10:26 08/04/17 16:44





  Morphine Injection -  IVPUSH   2 mg





  Q4H PRN   Administration





  PAIN   














ASSESSMENT AND PLAN:


This is a 21-year-old woman with no medical history who presented to the ER 

with bloody diarrhea.





1. SIRS secondary to ulcerative colitis


   - Levaquin, Flagyl discontinued


   - SoluCortef, Asacol, hydrocortisone enemas started


2. Acute blood loss anemia


   - Continue to monitor hemoglobin


3. Hypokalemia


   - Replete potassium

## 2017-08-05 LAB
ALBUMIN SERPL-MCNC: 1.9 G/DL (ref 3.4–5)
ALP SERPL-CCNC: 41 U/L (ref 45–117)
ALT SERPL-CCNC: 21 U/L (ref 12–78)
ANION GAP SERPL CALC-SCNC: 7 MMOL/L (ref 8–16)
AST SERPL-CCNC: 26 U/L (ref 15–37)
BASOPHILS # BLD: 0.2 % (ref 0–2)
BILIRUB CONJ SERPL-MCNC: 0.1 MG/DL (ref 0–0.2)
BILIRUB SERPL-MCNC: 0.5 MG/DL (ref 0.2–1)
CALCIUM SERPL-MCNC: 7.6 MG/DL (ref 8.5–10.1)
CO2 SERPL-SCNC: 24 MMOL/L (ref 21–32)
CREAT SERPL-MCNC: 0.3 MG/DL (ref 0.55–1.02)
CRP SERPL-MCNC: 5.8 MG/DL (ref 0–0.3)
DEPRECATED RDW RBC AUTO: 15.8 % (ref 11.6–15.6)
EOSINOPHIL # BLD: 0.3 % (ref 0–4.5)
GLUCOSE SERPL-MCNC: 113 MG/DL (ref 74–106)
MAGNESIUM SERPL-MCNC: 1.9 MG/DL (ref 1.8–2.4)
MCH RBC QN AUTO: 28.3 PG (ref 25.7–33.7)
MCHC RBC AUTO-ENTMCNC: 32.4 G/DL (ref 32–36)
MCV RBC: 87.3 FL (ref 80–96)
NEUTROPHILS # BLD: 83.1 % (ref 42.8–82.8)
PLATELET # BLD AUTO: 289 K/MM3 (ref 134–434)
PMV BLD: 9.6 FL (ref 7.5–11.1)
PROT SERPL-MCNC: 5.6 G/DL (ref 6.4–8.2)
WBC # BLD AUTO: 9.2 K/MM3 (ref 4–10)

## 2017-08-05 RX ADMIN — POTASSIUM CHLORIDE AND SODIUM CHLORIDE SCH MLS/HR: 900; 150 INJECTION, SOLUTION INTRAVENOUS at 17:06

## 2017-08-05 RX ADMIN — HEPARIN SODIUM SCH UNIT: 5000 INJECTION, SOLUTION INTRAVENOUS; SUBCUTANEOUS at 09:19

## 2017-08-05 RX ADMIN — MESALAMINE SCH MG: 800 TABLET, DELAYED RELEASE ORAL at 14:34

## 2017-08-05 RX ADMIN — HYDROCORTISONE SODIUM SUCCINATE SCH MG: 100 INJECTION, POWDER, FOR SOLUTION INTRAMUSCULAR; INTRAVENOUS at 21:23

## 2017-08-05 RX ADMIN — MORPHINE SULFATE PRN MG: 4 INJECTION, SOLUTION INTRAMUSCULAR; INTRAVENOUS at 16:56

## 2017-08-05 RX ADMIN — POTASSIUM CHLORIDE AND SODIUM CHLORIDE SCH MLS/HR: 900; 150 INJECTION, SOLUTION INTRAVENOUS at 05:28

## 2017-08-05 RX ADMIN — MORPHINE SULFATE PRN MG: 4 INJECTION, SOLUTION INTRAMUSCULAR; INTRAVENOUS at 09:19

## 2017-08-05 RX ADMIN — MESALAMINE SCH MG: 800 TABLET, DELAYED RELEASE ORAL at 05:22

## 2017-08-05 RX ADMIN — MICONAZOLE NITRATE SCH APPLIC: 20 CREAM VAGINAL at 21:46

## 2017-08-05 RX ADMIN — MESALAMINE SCH MG: 800 TABLET, DELAYED RELEASE ORAL at 21:25

## 2017-08-05 RX ADMIN — HYDROCORTISONE SODIUM SUCCINATE SCH MG: 100 INJECTION, POWDER, FOR SOLUTION INTRAMUSCULAR; INTRAVENOUS at 05:22

## 2017-08-05 RX ADMIN — HEPARIN SODIUM SCH UNIT: 5000 INJECTION, SOLUTION INTRAVENOUS; SUBCUTANEOUS at 21:26

## 2017-08-05 RX ADMIN — MORPHINE SULFATE PRN MG: 4 INJECTION, SOLUTION INTRAMUSCULAR; INTRAVENOUS at 00:03

## 2017-08-05 RX ADMIN — HYDROCORTISONE SCH MG: 100 SUSPENSION RECTAL at 21:25

## 2017-08-05 RX ADMIN — HYDROCORTISONE SODIUM SUCCINATE SCH MG: 100 INJECTION, POWDER, FOR SOLUTION INTRAMUSCULAR; INTRAVENOUS at 13:18

## 2017-08-05 RX ADMIN — POTASSIUM CHLORIDE AND SODIUM CHLORIDE SCH: 900; 150 INJECTION, SOLUTION INTRAVENOUS at 09:19

## 2017-08-05 RX ADMIN — MORPHINE SULFATE PRN MG: 4 INJECTION, SOLUTION INTRAMUSCULAR; INTRAVENOUS at 21:26

## 2017-08-05 RX ADMIN — HEPARIN SODIUM SCH UNIT: 5000 INJECTION, SOLUTION INTRAVENOUS; SUBCUTANEOUS at 00:03

## 2017-08-05 NOTE — PN
GI Progress Note


Subjective: 


Describes having cramping this morning requiring analgesia however overall 

states feeling much better.  Currently denies abdominal pain and says that 

bowel movements are more formed.  Denies shalini diarrhe











- Objective


Vital Signs: 


 Vital Signs











Temperature  98.1 F   08/05/17 06:00


 


Pulse Rate  73   08/05/17 06:00


 


Respiratory Rate  18   08/05/17 06:00


 


Blood Pressure  103/72   08/05/17 06:00


 


O2 Sat by Pulse Oximetry (%)  99   08/04/17 21:00











Constitutional: Calm


Eyes: No: Sclera Icterus


Cardiovascular: Yes: Regular Rate and Rhythm


Respiratory: Yes: CTA Bilaterally


Gastrointestinal Inspection: No: Distention


...Auscultate: Yes: Normoactive Bowel Sounds


...Palpate: No: Tenderness


Edema: No


Neurological: Yes: Alert, Oriented


Labs: 


 CBC, BMP





 08/05/17 07:35 





 08/05/17 07:35 





 INR, PTT











INR  1.53  (0.82-1.09)  H  08/04/17  06:58    














Problem List





- Problems


(1) Colitis


Assessment/Plan: 


Suspected Pan Ulcerative Colitis:


Improving clincially on current therapy


Continue current dose of IV steroids. if continued improvements will begin to 

taper


Continue Cortenemas


Continue Asacol HD at current Dose


Add calcium w/ Vitamin D while on corticosteroids


Low fiber/Low Residue diet 


Code(s): K52.9 - NONINFECTIVE GASTROENTERITIS AND COLITIS, UNSPECIFIED

## 2017-08-05 NOTE — PN
Physical Exam: 


SUBJECTIVE: Patient seen and examined. She denies abdominal pain, nausea. 

Stools are becoming more formed.








OBJECTIVE:





 Vital Signs











 Period  Temp  Pulse  Resp  BP Sys/Hung  Pulse Ox


 


 Last 24 Hr  98.1 F-100.6 F    16-20  /53-72  











GENERAL: The patient is awake, alert, and fully oriented, in no acute distress.


LUNGS: Breath sounds equal, clear to auscultation bilaterally, no wheezes, no 

crackles, no accessory muscle use. 


HEART: Regular rate and rhythm, S1, S2 without murmur, rub or gallop.


ABDOMEN: Soft, nontender, nondistended, normoactive bowel sounds, no guarding, 

no rebound, no hepatosplenomegaly, no masses.


EXTREMITIES: 2+ pulses, warm, well-perfused, no edema. 














 Laboratory Results - last 24 hr











  08/01/17 08/02/17 08/05/17





  17:30 07:40 07:35


 


WBC   


 


RBC   


 


Hgb   


 


Hct   


 


MCV   


 


MCH   


 


MCHC   


 


RDW   


 


Plt Count   


 


MPV   


 


Neutrophils %   


 


Lymphocytes %   


 


Monocytes %   


 


Eosinophils %   


 


Basophils %   


 


Retic Count   


 


Sodium    142


 


Potassium    4.1  D


 


Chloride    111 H


 


Carbon Dioxide    24


 


Anion Gap    7 L


 


BUN    4 L D


 


Creatinine    0.3 L D


 


Random Glucose    113 H D


 


Calcium    7.6 L


 


Magnesium   


 


Total Bilirubin   


 


Direct Bilirubin   


 


AST   


 


ALT   


 


Alkaline Phosphatase   


 


C-Reactive Protein    5.8 H D


 


Total Protein   


 


Albumin   


 


Stool O & P [place]    


 


Atypical p-ANCA   <1:20 


 


Hepatitis A Ab Total   Negative 


 


Hep Bs Antigen   Negative 


 


Hep Bs Antibody   Non reactive 


 


Hep B Core Total Ab   Negative 


 


Hepatitis C Antibody   0.2 


 


S.cerevisiae IgG Ab   <20.0 


 


S. cerevisiae IgG/IgA   <20.0 


 


O & P Permanent Slide  Final report  














  08/05/17 08/05/17 08/05/17





  07:35 07:35 07:35


 


WBC  9.2  


 


RBC  3.27 L  


 


Hgb  9.3 L  


 


Hct  28.6 L  


 


MCV  87.3  


 


MCH  28.3  


 


MCHC  32.4  


 


RDW  15.8 H  


 


Plt Count  289  D  


 


MPV  9.6  


 


Neutrophils %  83.1 H D  


 


Lymphocytes %  10.8  


 


Monocytes %  5.6  


 


Eosinophils %  0.3  D  


 


Basophils %  0.2  


 


Retic Count  1.70 H  


 


Sodium   


 


Potassium   


 


Chloride   


 


Carbon Dioxide   


 


Anion Gap   


 


BUN   


 


Creatinine   


 


Random Glucose   


 


Calcium   


 


Magnesium   1.9  Cancelled


 


Total Bilirubin   0.5  D 


 


Direct Bilirubin   0.1 


 


AST   26 


 


ALT   21  D 


 


Alkaline Phosphatase   41 L D 


 


C-Reactive Protein   


 


Total Protein   5.6 L 


 


Albumin   1.9 L 


 


Stool O & P Wet [place]   


 


Atypical p-ANCA   


 


Hepatitis A Ab Total   


 


Hep Bs Antigen   


 


Hep Bs Antibody   


 


Hep B Core Total Ab   


 


Hepatitis C Antibody   


 


S.cerevisiae IgG Ab   


 


S. cerevisiae IgG/IgA   


 


O & P Permanent Slide   








Active Medications











Generic Name Dose Route Start Last Admin





  Trade Name Freq  PRN Reason Stop Dose Admin


 


Acetaminophen  650 mg 07/30/17 09:52 08/01/17 01:13





  Tylenol -  PO   650 mg





  Q4H PRN   Administration





  FEVER OR PAIN   


 


Heparin Sodium (Porcine)  5,000 unit 08/04/17 22:00 08/05/17 09:19





  Heparin -  SQ   5,000 unit





  BID NORM   Administration


 


Hydrocortisone  100 mg 08/04/17 22:00 08/04/17 22:51





  Cortenema -  RC   100 mg





  HS NORM   Administration


 


Hydrocortisone Sodium Succinate  100 mg 08/04/17 12:00 08/05/17 05:22





  Solu-Cortef -  IVPB   100 mg





  Q8H NORM   Administration


 


Potassium Chloride/Sodium Chloride  1,000 mls @ 150 mls/hr 08/03/17 08:33 08/05/ 17 09:19





  Ns+20 Meq Kcl -  IV   Not Given





  ASDIR NORM   


 


Mesalamine  800 mg 08/04/17 14:00 08/05/17 05:22





  Asacol Hd -  PO   800 mg





  TID NORM   Administration


 


Miconazole Nitrate  1 applic 08/02/17 22:00 08/04/17 21:30





  Monistat-7 Vaginal Cream -  VG 08/08/17 22:01  Not Given





  HS NORM   


 


Morphine Sulfate  2 mg 07/31/17 10:26 08/05/17 09:19





  Morphine Injection -  IVPUSH   2 mg





  Q4H PRN   Administration





  PAIN   











ASSESSMENT/PLAN:


This is a 21-year-old woman with no medical history who presented to the ER 

with bloody diarrhea.





1. SIRS secondary to probable ulcerative colitis


   - Clinically improving; C-RP 16.6 -> 11.1 -> 5.8


   - Flexible sigmoidiscopy showed friable, ulcerated, edematous mucosa with 

superficial ulcers suggestive of ulcerative colitis


   - Levaquin, Flagyl discontinued


   - Continue SoluCortef, Asacol, hydrocortisone enemas


   - Follow-up pathology


2. Acute blood loss anemia


   - Hemoglobin is stable, continue to monitor hemoglobin


3. Hypokalemia


   - Improved





Visit type





- Emergency Visit


Emergency Visit: Yes


ED Registration Date: 07/30/17


Care time: The patient presented to the Emergency Department on the above date 

and was hospitalized for further evaluation of their emergent condition.





- New Patient


This patient is new to me today: No





- Critical Care


Critical Care patient: No





- Discharge Referral


Referred to [facility] No

## 2017-08-05 NOTE — PN
Progress Note (short form)





- Note


Progress Note: 


Covering for Dr. Ollie Guerrero





Pt with colitis and diarrhea, underwent flex sig yesterday with GI. Results 

suggestive of ulcerative colitis. Pt was started on steroids and has been 

improving. Reports stools becoming more formed. Pain is essentially now mostly 

cramping; she states it has been hard to pass flatus. Tolerating diet.





 Vital Signs











 Period  Temp  Pulse  Resp  BP Sys/Hung  Pulse Ox


 


 Last 24 Hr  98.1 F-100.6 F    18-18  101-104/59-72  99-99








PE: abdomen soft, distended, nontender except very minimally suprapubic, 

minimal tympany





 CBC, BMP





 08/05/17 07:35 





 08/05/17 07:35 





biopsies from scope pending





A/P: ulcerative colitis


now on steroid treatment with slow improvement


antibiotics d/c'd


no surgical intervention currently indicated


will sign off


please recontact as needed





Thank you,


Cale MILLER





Problem List





- Problems


(1) Ulcerative colitis, acute


Code(s): K51.90 - ULCERATIVE COLITIS, UNSPECIFIED, WITHOUT COMPLICATIONS   

Qualifiers: 


     Digestive disease complication type: without complication        Qualified 

Code(s): K51.90 - Ulcerative colitis, unspecified, without complications

## 2017-08-06 LAB
ANION GAP SERPL CALC-SCNC: 7 MMOL/L (ref 8–16)
BASOPHILS # BLD: 0.1 % (ref 0–2)
CALCIUM SERPL-MCNC: 7.6 MG/DL (ref 8.5–10.1)
CO2 SERPL-SCNC: 24 MMOL/L (ref 21–32)
CREAT SERPL-MCNC: 0.3 MG/DL (ref 0.55–1.02)
CRP SERPL-MCNC: 2.6 MG/DL (ref 0–0.3)
DEPRECATED RDW RBC AUTO: 16 % (ref 11.6–15.6)
EOSINOPHIL # BLD: 0 % (ref 0–4.5)
GLUCOSE SERPL-MCNC: 147 MG/DL (ref 74–106)
MCH RBC QN AUTO: 28.6 PG (ref 25.7–33.7)
MCHC RBC AUTO-ENTMCNC: 33.2 G/DL (ref 32–36)
MCV RBC: 86.2 FL (ref 80–96)
NEUTROPHILS # BLD: 83.2 % (ref 42.8–82.8)
PLATELET # BLD AUTO: 317 K/MM3 (ref 134–434)
PMV BLD: 9 FL (ref 7.5–11.1)
WBC # BLD AUTO: 11.1 K/MM3 (ref 4–10)

## 2017-08-06 RX ADMIN — HEPARIN SODIUM SCH UNIT: 5000 INJECTION, SOLUTION INTRAVENOUS; SUBCUTANEOUS at 10:48

## 2017-08-06 RX ADMIN — MORPHINE SULFATE PRN MG: 4 INJECTION, SOLUTION INTRAMUSCULAR; INTRAVENOUS at 21:58

## 2017-08-06 RX ADMIN — Medication SCH TAB: at 21:57

## 2017-08-06 RX ADMIN — HYDROCORTISONE SODIUM SUCCINATE SCH MG: 100 INJECTION, POWDER, FOR SOLUTION INTRAMUSCULAR; INTRAVENOUS at 17:52

## 2017-08-06 RX ADMIN — MESALAMINE SCH MG: 800 TABLET, DELAYED RELEASE ORAL at 21:57

## 2017-08-06 RX ADMIN — MICONAZOLE NITRATE SCH APPLIC: 20 CREAM VAGINAL at 21:57

## 2017-08-06 RX ADMIN — HYDROCORTISONE SODIUM SUCCINATE SCH MG: 100 INJECTION, POWDER, FOR SOLUTION INTRAMUSCULAR; INTRAVENOUS at 03:38

## 2017-08-06 RX ADMIN — HEPARIN SODIUM SCH UNIT: 5000 INJECTION, SOLUTION INTRAVENOUS; SUBCUTANEOUS at 21:56

## 2017-08-06 RX ADMIN — POTASSIUM CHLORIDE AND SODIUM CHLORIDE SCH MLS/HR: 900; 150 INJECTION, SOLUTION INTRAVENOUS at 17:53

## 2017-08-06 RX ADMIN — MORPHINE SULFATE PRN MG: 4 INJECTION, SOLUTION INTRAMUSCULAR; INTRAVENOUS at 06:08

## 2017-08-06 RX ADMIN — POTASSIUM CHLORIDE AND SODIUM CHLORIDE SCH MLS/HR: 900; 150 INJECTION, SOLUTION INTRAVENOUS at 01:19

## 2017-08-06 RX ADMIN — MESALAMINE SCH MG: 800 TABLET, DELAYED RELEASE ORAL at 06:08

## 2017-08-06 RX ADMIN — HYDROCORTISONE SCH MG: 100 SUSPENSION RECTAL at 23:17

## 2017-08-06 RX ADMIN — MORPHINE SULFATE PRN MG: 4 INJECTION, SOLUTION INTRAMUSCULAR; INTRAVENOUS at 14:00

## 2017-08-06 RX ADMIN — POTASSIUM CHLORIDE AND SODIUM CHLORIDE SCH MLS/HR: 900; 150 INJECTION, SOLUTION INTRAVENOUS at 08:53

## 2017-08-06 RX ADMIN — MESALAMINE SCH MG: 800 TABLET, DELAYED RELEASE ORAL at 14:30

## 2017-08-06 RX ADMIN — MORPHINE SULFATE PRN MG: 4 INJECTION, SOLUTION INTRAMUSCULAR; INTRAVENOUS at 01:19

## 2017-08-06 RX ADMIN — Medication SCH TAB: at 10:49

## 2017-08-06 RX ADMIN — HYDROCORTISONE SODIUM SUCCINATE SCH MG: 100 INJECTION, POWDER, FOR SOLUTION INTRAMUSCULAR; INTRAVENOUS at 10:49

## 2017-08-06 RX ADMIN — POTASSIUM CHLORIDE AND SODIUM CHLORIDE SCH MLS/HR: 900; 150 INJECTION, SOLUTION INTRAVENOUS at 23:18

## 2017-08-06 NOTE — PN
Physical Exam: 


SUBJECTIVE: Patient seen and examined


The patient is a 21-year-old woman with no medical history who was admitted to 

inpatient services with SIRS secondary to a presumed first episode of 

ulcerative colitis.





She states that she feels better and that her stools are becoming more well 

formed.





OBJECTIVE:





 Vital Signs











 Period  Temp  Pulse  Resp  BP Sys/Hung  Pulse Ox


 


 Last 24 Hr  97.8 F-98.6 F  63-86  17-18  101-115/56-67  98











HEART: S1S2, RRR


LUNGS: Rhonchi at left base


ABDOMEN: Soft, non-tender to superficial palpation, non-distended, normal BS


EXTREMITIES: Trace edema











 Laboratory Results - last 24 hr











  17





  07:35 07:35 07:35


 


WBC   


 


RBC   


 


Hgb   


 


Hct   


 


MCV   


 


MCH   


 


MCHC   


 


RDW   


 


Plt Count   


 


MPV   


 


Neutrophils %   


 


Lymphocytes %   


 


Monocytes %   


 


Eosinophils %   


 


Basophils %   


 


Sodium  142  


 


Potassium  4.1  D  


 


Chloride  111 H  


 


Carbon Dioxide  24  


 


Anion Gap  7 L  


 


BUN  4 L D  


 


Creatinine  0.3 L D  


 


Random Glucose  113 H D  


 


Calcium  7.6 L  


 


Magnesium   1.9  Cancelled


 


Total Bilirubin   0.5  D 


 


Direct Bilirubin   0.1 


 


AST   26 


 


ALT   21  D 


 


Alkaline Phosphatase   41 L D 


 


C-Reactive Protein  5.8 H D  


 


Total Protein   5.6 L 


 


Albumin   1.9 L 














  17





  07:20 07:20


 


WBC   11.1 H


 


RBC   3.00 L


 


Hgb   8.6 L


 


Hct   25.9 L


 


MCV   86.2


 


MCH   28.6


 


MCHC   33.2


 


RDW   16.0 H


 


Plt Count   317


 


MPV   9.0


 


Neutrophils %   83.2 H


 


Lymphocytes %   11.3


 


Monocytes %   5.4


 


Eosinophils %   0.0  D


 


Basophils %   0.1


 


Sodium  144 


 


Potassium  4.3 


 


Chloride  113 H 


 


Carbon Dioxide  24 


 


Anion Gap  7 L 


 


BUN  7  D 


 


Creatinine  0.3 L 


 


Random Glucose  147 H D 


 


Calcium  7.6 L 


 


Magnesium  


 


Total Bilirubin  


 


Direct Bilirubin  


 


AST  


 


ALT  


 


Alkaline Phosphatase  


 


C-Reactive Protein  2.6 H D 


 


Total Protein  


 


Albumin  








Active Medications











Generic Name Dose Route Start Last Admin





  Trade Name Freq  PRN Reason Stop Dose Admin


 


Acetaminophen  650 mg 17 09:52 17 01:13





  Tylenol -  PO   650 mg





  Q4H PRN   Administration





  FEVER OR PAIN   


 


Heparin Sodium (Porcine)  5,000 unit 17 22:00 17 21:26





  Heparin -  SQ   5,000 unit





  BID NORM   Administration


 


Hydrocortisone  100 mg 17 22:00 17 21:25





  Cortenema -  RC   100 mg





  HS NORM   Administration


 


Hydrocortisone Sodium Succinate  100 mg 17 12:00 17 03:38





  Solu-Cortef -  IVPB   100 mg





  Q8H NORM   Administration


 


Potassium Chloride/Sodium Chloride  1,000 mls @ 150 mls/hr 17 08:33  08:53





  Ns+20 Meq Kcl -  IV   150 mls/hr





  ASDIR NORM   Administration


 


Mesalamine  800 mg 17 14:00 17 06:08





  Asacol Hd -  PO   800 mg





  TID NORM   Administration


 


Miconazole Nitrate  1 applic 17 22:00 17 21:46





  Monistat-7 Vaginal Cream -  VG 17 22:01  1 applic





  HS NORM   Administration


 


Morphine Sulfate  2 mg 17 10:26 17 06:08





  Morphine Injection -  IVPUSH   2 mg





  Q4H PRN   Administration





  PAIN   











ASSESSMENT/PLAN:





#GI


Probable ulcerative colitis





Appreciate GI input and Surgery input


She is clinically improving


CRP is down-trendin.6 -> 11.1 -> 5.8 -> 2.6 (today)


Flexible sigmoidiscopy showed friable, ulcerated, edematous mucosa with 

superficial ulcers suggestive of ulcerative colitis


Levaquin and Flagyl were discontinued


Continue SoluCortef (dose lowered by GI), Asacol, hydrocortisone enemas


Adding Ca+D while on corticsteroids


Tylenol and Morphine pending


Follow-up pathology





Acute blood loss anemia


Hemoglobin dropped


Will obtain a 6pm CBC





#WAGNER


Acute blood loss anemia





Check 6pm CBC as above





#FEN


Continue low fiber diet


Replete lytes prn


Adding Ca+D





#PROPHYLAXIS


Hep SQ


Adding Protonix given steroids and acute illness





#DISPOSITION


Possible discharge tomorrow morning





Visit type





- Emergency Visit


Emergency Visit: Yes


ED Registration Date: 17


Care time: The patient presented to the Emergency Department on the above date 

and was hospitalized for further evaluation of their emergent condition.





- New Patient


This patient is new to me today: Yes


Date on this admission: 17





- Critical Care


Critical Care patient: No

## 2017-08-06 NOTE — PN
GI Progress Note


Subjective: 


Sitting up eating breakfast, states feeling well


Had abdominal pain yesterday, none today


Stool becoming more formed per the patient 








- Objective


Vital Signs: 


 Vital Signs











Temperature  98.1 F   08/06/17 06:00


 


Pulse Rate  63   08/06/17 06:00


 


Respiratory Rate  18   08/06/17 06:00


 


Blood Pressure  104/66   08/06/17 06:00


 


O2 Sat by Pulse Oximetry (%)  98   08/05/17 21:00











Constitutional: Calm


Eyes: No: Sclera Icterus


Cardiovascular: Yes: Regular Rate and Rhythm


Respiratory: Yes: CTA Bilaterally


...Auscultate: Yes: Normoactive Bowel Sounds


...Palpate: No: Tenderness


...Percussion: No: Tympanitic


Edema: No


Neurological: Yes: Alert, Oriented


Labs: 


 CBC, BMP





 08/06/17 07:20 





 08/06/17 07:20 





 INR, PTT











INR  1.53  (0.82-1.09)  H  08/04/17  06:58    














Problem List





- Problems


(1) Colitis


Assessment/Plan: 


Suspected Pan Ulcerative Colitis:


Doing much better clinically


Continuing Hydrosorticone but decreased to 75mg q 8 hours


Continue cortenema for now


Asacol HD 800mg PO TID


Calcium / Vit D





Code(s): K52.9 - NONINFECTIVE GASTROENTERITIS AND COLITIS, UNSPECIFIED

## 2017-08-07 LAB
ANION GAP SERPL CALC-SCNC: 5 MMOL/L (ref 8–16)
BASOPHILS # BLD: 0.1 % (ref 0–2)
CALCIUM SERPL-MCNC: 7.8 MG/DL (ref 8.5–10.1)
CO2 SERPL-SCNC: 27 MMOL/L (ref 21–32)
CREAT SERPL-MCNC: 0.5 MG/DL (ref 0.55–1.02)
DEPRECATED RDW RBC AUTO: 15.7 % (ref 11.6–15.6)
EOSINOPHIL # BLD: 0.1 % (ref 0–4.5)
GLUCOSE SERPL-MCNC: 156 MG/DL (ref 74–106)
MAGNESIUM SERPL-MCNC: 1.9 MG/DL (ref 1.8–2.4)
MCH RBC QN AUTO: 28.4 PG (ref 25.7–33.7)
MCHC RBC AUTO-ENTMCNC: 32.8 G/DL (ref 32–36)
MCV RBC: 86.4 FL (ref 80–96)
NEUTROPHILS # BLD: 87.6 % (ref 42.8–82.8)
PHOSPHATE SERPL-MCNC: 2.9 MG/DL (ref 2.5–4.9)
PLATELET # BLD AUTO: 367 K/MM3 (ref 134–434)
PMV BLD: 9 FL (ref 7.5–11.1)
WBC # BLD AUTO: 13.2 K/MM3 (ref 4–10)

## 2017-08-07 RX ADMIN — MICONAZOLE NITRATE SCH APPLIC: 20 CREAM VAGINAL at 21:37

## 2017-08-07 RX ADMIN — MESALAMINE SCH MG: 800 TABLET, DELAYED RELEASE ORAL at 15:07

## 2017-08-07 RX ADMIN — HEPARIN SODIUM SCH UNIT: 5000 INJECTION, SOLUTION INTRAVENOUS; SUBCUTANEOUS at 10:46

## 2017-08-07 RX ADMIN — OXYCODONE HYDROCHLORIDE AND ACETAMINOPHEN SCH MG: 500 TABLET ORAL at 12:32

## 2017-08-07 RX ADMIN — MESALAMINE SCH MG: 800 TABLET, DELAYED RELEASE ORAL at 06:47

## 2017-08-07 RX ADMIN — HYDROCORTISONE SCH MG: 100 SUSPENSION RECTAL at 21:36

## 2017-08-07 RX ADMIN — HEPARIN SODIUM SCH UNIT: 5000 INJECTION, SOLUTION INTRAVENOUS; SUBCUTANEOUS at 21:35

## 2017-08-07 RX ADMIN — MORPHINE SULFATE PRN MG: 4 INJECTION, SOLUTION INTRAMUSCULAR; INTRAVENOUS at 06:47

## 2017-08-07 RX ADMIN — Medication SCH TAB: at 10:46

## 2017-08-07 RX ADMIN — MESALAMINE SCH MG: 800 TABLET, DELAYED RELEASE ORAL at 21:36

## 2017-08-07 RX ADMIN — MORPHINE SULFATE PRN MG: 4 INJECTION, SOLUTION INTRAMUSCULAR; INTRAVENOUS at 10:46

## 2017-08-07 RX ADMIN — MORPHINE SULFATE PRN MG: 4 INJECTION, SOLUTION INTRAMUSCULAR; INTRAVENOUS at 02:49

## 2017-08-07 RX ADMIN — POTASSIUM CHLORIDE AND SODIUM CHLORIDE SCH: 900; 150 INJECTION, SOLUTION INTRAVENOUS at 10:16

## 2017-08-07 RX ADMIN — HYDROCORTISONE SODIUM SUCCINATE SCH MG: 100 INJECTION, POWDER, FOR SOLUTION INTRAMUSCULAR; INTRAVENOUS at 02:50

## 2017-08-07 RX ADMIN — HYDROCORTISONE SODIUM SUCCINATE SCH MG: 100 INJECTION, POWDER, FOR SOLUTION INTRAMUSCULAR; INTRAVENOUS at 17:44

## 2017-08-07 RX ADMIN — ACETAMINOPHEN PRN MG: 325 TABLET ORAL at 21:36

## 2017-08-07 RX ADMIN — Medication SCH TAB: at 21:36

## 2017-08-07 RX ADMIN — HYDROCORTISONE SODIUM SUCCINATE SCH MG: 100 INJECTION, POWDER, FOR SOLUTION INTRAMUSCULAR; INTRAVENOUS at 10:46

## 2017-08-07 RX ADMIN — FERROUS SULFATE TAB EC 324 MG (65 MG FE EQUIVALENT) SCH MG: 324 (65 FE) TABLET DELAYED RESPONSE at 12:32

## 2017-08-07 NOTE — PN
Teaching Attending Note


Name of Resident: Vicenta Elam


ATTENDING PHYSICIAN STATEMENT





I saw and evaluated the patient.


I reviewed the resident's note and discussed the case with the resident.


I agree with the resident's findings and plan as documented.








SUBJECTIVE:


Diarrhea improved


Pain slightly worsened


She is hungry





OBJECTIVE:


Vitals noted





ASSESSMENT AND PLAN:


Continue current diet


Taper steroids today


Discontinue IVF


Change to po KCL


Appreciate GI input


Anticipate discharge tomorrow if she continues to improve


See resident note for full details

## 2017-08-07 NOTE — PN
GI Progress Note


Subjective: 


Still notices abdominal cramping, otherwise describes normalized bowel movements


No rectal bleeding


No fevers








- Objective


Vital Signs: 


 Vital Signs











Temperature  97.9 F   08/07/17 06:00


 


Pulse Rate  57 L  08/07/17 06:00


 


Respiratory Rate  18   08/07/17 06:00


 


Blood Pressure  113/64   08/07/17 06:00


 


O2 Sat by Pulse Oximetry (%)  99   08/06/17 21:00











Constitutional: Calm


Eyes: No: Sclera Icterus


Cardiovascular: Yes: Regular Rate and Rhythm


Respiratory: Yes: CTA Bilaterally


Gastrointestinal Inspection: No: Distention


...Auscultate: Yes: Normoactive Bowel Sounds


...Palpate: No: Tenderness


...Percussion: No: Tympanitic


Edema: No


Labs: 


 CBC, BMP





 08/07/17 06:50 





 08/07/17 06:50 





 INR, PTT











INR  1.53  (0.82-1.09)  H  08/04/17  06:58    








 Hepatic Panel











Total Bilirubin  0.5 mg/dL (0.2-1.0)  D 08/05/17  07:35    


 


Direct Bilirubin  0.1 mg/dL (0.0-0.2)   08/05/17  07:35    


 


AST  26 U/L (15-37)   08/05/17  07:35    


 


ALT  21 U/L (12-78)  D 08/05/17  07:35    


 


Alkaline Phosphatase  41 U/L ()  L D 08/05/17  07:35    


 


Albumin  1.9 g/dl (3.4-5.0)  L  08/05/17  07:35    














Problem List





- Problems


(1) Colitis


Assessment/Plan: 


Pan Ulcerative Colitis:


Clinically improved


On Hydrocortisone IV taper. Changed to PO prednisone 40mg once daily starting 

tomorrow


Continue Asacol D 800mg PO TID


Added Ferrous sulfate 325mg once daily along with Vitamin C 500mg PO daily


If clinically well, no GI objection to D/C home tomorrow.  Will need f/u 

appointment with Dr. Zarco in 1-2 weeks for continued outpatient care




















Code(s): K52.9 - NONINFECTIVE GASTROENTERITIS AND COLITIS, UNSPECIFIED

## 2017-08-07 NOTE — PN
Physical Exam: 


SUBJECTIVE: Patient seen and examined at bedside. Pt still having abdominal pain

, however it is less than before. Tolerating diet without issues- no N/V. 

States that she is still having loose bowel movements with blood, but it is 

less frequent.








OBJECTIVE:





 Vital Signs











 Period  Temp  Pulse  Resp  BP Sys/Hung  Pulse Ox


 


 Last 24 Hr  97.9 F-99.0 F  57-81  18-18  107-120/64-77  99-99











GENERAL: The patient is awake, alert, and fully oriented, in no acute distress.


HEAD: Normal with no signs of trauma.


EYES: PERRL, extraocular movements intact, sclera anicteric, conjunctiva clear.


ENT: Ears normal, nares patent, oropharynx clear without exudates, moist mucous 


membranes.


NECK: Trachea midline, full range of motion, supple. 


LUNGS: Breath sounds equal, clear to auscultation bilaterally, no wheezes, no 

crackles, no 


accessory muscle use. 


HEART: Regular rate and rhythm, S1, S2 without murmur, rub or gallop.


ABDOMEN: Soft, mildly tender, nondistended, normoactive bowel sounds, no 

guarding, no 


rebound, no hepatosplenomegaly, no masses.


EXTREMITIES: 2+ posterior tibial pulses, warm, well-perfused, no edema. 


NEUROLOGICAL: Cranial nerves II through XII grossly intact. 

















 Laboratory Results - last 24 hr











  08/07/17 08/07/17





  06:50 06:50


 


WBC  13.2 H 


 


RBC  3.05 L 


 


Hgb  8.6 L 


 


Hct  26.3 L 


 


MCV  86.4 


 


MCH  28.4 


 


MCHC  32.8 


 


RDW  15.7 H 


 


Plt Count  367 


 


MPV  9.0 


 


Neutrophils %  87.6 H 


 


Lymphocytes %  7.9 L D 


 


Monocytes %  4.3 


 


Eosinophils %  0.1  D 


 


Basophils %  0.1 


 


Sodium   144


 


Potassium   4.1


 


Chloride   112 H


 


Carbon Dioxide   27


 


Anion Gap   5 L


 


BUN   8


 


Creatinine   0.5 L D


 


Random Glucose   156 H


 


Calcium   7.8 L


 


Phosphorus   2.9


 


Magnesium   1.9








Active Medications











Generic Name Dose Route Start Last Admin





  Trade Name Freq  PRN Reason Stop Dose Admin


 


Acetaminophen  650 mg 07/30/17 09:52 08/01/17 01:13





  Tylenol -  PO   650 mg





  Q4H PRN   Administration





  FEVER OR PAIN   


 


Ascorbic Acid  500 mg 08/07/17 11:15 08/07/17 12:32





  Vitamin C -  PO   500 mg





  DAILY NORM   Administration


 


Calcium Carbonate/Cholecalciferol  1 tab 08/06/17 10:00 08/07/17 10:46





  Os-Pritesh 500+D -  PO   1 tab





  BID NORM   Administration


 


Ferrous Sulfate  325 mg 08/07/17 11:15 08/07/17 12:32





  Feosol -  PO   325 mg





  DAILY NORM   Administration


 


Heparin Sodium (Porcine)  5,000 unit 08/04/17 22:00 08/07/17 10:46





  Heparin -  SQ   5,000 unit





  BID NORM   Administration


 


Hydrocortisone  100 mg 08/04/17 22:00 08/06/17 23:17





  Cortenema -  RC   100 mg





  HS NORM   Administration


 


Hydrocortisone Sodium Succinate  75 mg 08/06/17 09:45 08/07/17 10:46





  Solu-Cortef -  IVPB 08/08/17 06:00  75 mg





  Q8H NORM   Administration


 


Mesalamine  800 mg 08/04/17 14:00 08/07/17 15:07





  Asacol Hd -  PO   800 mg





  TID NORM   Administration


 


Miconazole Nitrate  1 applic 08/02/17 22:00 08/06/17 21:57





  Monistat-7 Vaginal Cream -  VG 08/08/17 22:01  1 applic





  HS NORM   Administration


 


Potassium Chloride  40 meq 08/08/17 10:00  





  K-Dur -  PO   





  DAILY NORM   


 


Prednisone  40 mg 08/08/17 10:00  





  Deltasone -  PO   





  DAILY [facility]   











ASSESSMENT/PLAN:





This is a 20 y/o F with no significant PMH who presented to the ED with 1 week 

of bloody diarrhea. Pt admitted for sepsis secondary to ulcerative colitis.





# Sepsis secondary to ulcerative colitis


-afebrile, leukocytosis 13.2 (poss from steroid rxn)


-Continue mesalamine 800 mg PO TID, cortenema 100 mg RC HS, Prednisone 40mg PO 

qdaily, Solucortef 75 mg IVPB q8 


-Calcium carbonate/cholecalciferol added while pt on steroids


-Pain control with Tylenol 650 mg PO q4 PRN, morphine d/c to avoid dependence





#Hypokalemia-resolved


-IV NS with 20 mEq Kcl d/c


-Added 40 mEq PO Kdur to regime so pt does not become hypokalemic





#Vaginal itching


-Continue Monistat 1 application PRN when symptomatic





#BRBPR


-Most likely d/t Ulcerative colitis


-Pt's last Hb 8.6 (down from 9.3), will continue to monitor


-Ferrous sulfate 325mg PO qdaily added with Vitamin C 500 mg PO qdaily


-F/u next CBC





#asymptomatic, +leukocyte esterase on  UA - resolved


-Urine cx: no growth





DVT Prophylaxis


Heparin 5000 U SQ BID





F/E/N


-Monitor electrolytes, especially potassium


-Low fiber diet











Visit type





- Emergency Visit


Emergency Visit: No





- New Patient


This patient is new to me today: No





- Critical Care


Critical Care patient: No

## 2017-08-07 NOTE — PATH
Surgical Pathology Report



Patient Name:  MARIA R THAKKAR

Accession #:  [id number]

[facility]. Rec. #:  [id number]                                                        

   /Age/Gender:  1995 (Age: 21) / F

Account:  [id number]                                                          

             Location: [address] [place] [address]/[facility]

Taken:  2017

Received:  2017

Reported:  2017

Physicians:  RITO Ashley M.D.

  



Specimen(s) Received

A: BX SIGMOID @ 35CM 

B: BX SIGMOID COLON @20CM 

C: BX RECTUM 





Clinical History

Bloody diarrhea

Ulcerative colitis







Final Diagnosis

A. COLON, SIGMOID AT 35 CM, BIOPSY:  

ACTIVE COLITIS WITH ARCHITECTURAL DISTORTION CONSISTENT WITH ACTIVE IDIOPATHIC

INFLAMMATORY BOWEL DISEASE.

NO GRANULOMATA OR DYSPLASIA IDENTIFIED.



B. COLON, SIGMOID AT 20 CM, BIOPSY:  

ACTIVE COLITIS WITH ARCHITECTURAL DISTORTION CONSISTENT WITH ACTIVE IDIOPATHIC

INFLAMMATORY BOWEL DISEASE.

NO GRANULOMATA OR DYSPLASIA IDENTIFIED.



C. COLON, RECTUM, BIOPSY:  

ACTIVE COLITIS WITH ARCHITECTURAL DISTORTION CONSISTENT WITH ACTIVE IDIOPATHIC

INFLAMMATORY BOWEL DISEASE.

NO GRANULOMATA OR DYSPLASIA IDENTIFIED.



Comment: The findings are morphologically consistent with ulcerative colitis.

Recommend correlation with clinical findings and followup as clinically

indicated.







***Electronically Signed***

Izaiah Chi M.D.





Gross Description

A.  Received in formalin, labeled "biopsy sigmoid colon at 35 cm" are 4 tan,

irregular portions of soft tissue ranging from 0.1-0.3 cm. in greatest

dimension. The specimens are submitted in toto in one cassette.



B.  Received in formalin, labeled "biopsy sigmoid colon at 20 cm" are 4 tan,

irregular portions of soft tissue ranging from 0.2-0.4 cm. in greatest

dimension. The specimens are submitted in toto in one cassette.



C.  Received in formalin, labeled "biopsy rectum" are 3 tan, irregular portions

of soft tissue ranging from 0.1-0.2 cm. in greatest dimension. The specimens are

submitted in toto in one cassette.

## 2017-08-08 LAB
ANION GAP SERPL CALC-SCNC: 8 MMOL/L (ref 8–16)
BASOPHILS # BLD: 0.1 % (ref 0–2)
BASOPHILS # BLD: 0.3 % (ref 0–2)
C-ANCA TITR SER: (no result) TITER
CALCIUM SERPL-MCNC: 8 MG/DL (ref 8.5–10.1)
CO2 SERPL-SCNC: 30 MMOL/L (ref 21–32)
CREAT SERPL-MCNC: 0.4 MG/DL (ref 0.55–1.02)
DEPRECATED RDW RBC AUTO: 15.7 % (ref 11.6–15.6)
DEPRECATED RDW RBC AUTO: 15.8 % (ref 11.6–15.6)
EOSINOPHIL # BLD: 0 % (ref 0–4.5)
EOSINOPHIL # BLD: 0 % (ref 0–4.5)
GLUCOSE SERPL-MCNC: 104 MG/DL (ref 74–106)
MCH RBC QN AUTO: 28.5 PG (ref 25.7–33.7)
MCH RBC QN AUTO: 29.3 PG (ref 25.7–33.7)
MCHC RBC AUTO-ENTMCNC: 32.7 G/DL (ref 32–36)
MCHC RBC AUTO-ENTMCNC: 33.5 G/DL (ref 32–36)
MCV RBC: 87.2 FL (ref 80–96)
MCV RBC: 87.3 FL (ref 80–96)
MYELOPEROXIDASE AB SER-ACNC: <9 U/ML (ref 0–9)
NEUTROPHILS # BLD: 87.6 % (ref 42.8–82.8)
NEUTROPHILS # BLD: 88.8 % (ref 42.8–82.8)
P-ANCA TITR SER IF: (no result) TITER
PLATELET # BLD AUTO: 400 K/MM3 (ref 134–434)
PLATELET # BLD AUTO: 483 K/MM3 (ref 134–434)
PMV BLD: 9 FL (ref 7.5–11.1)
PMV BLD: 9.1 FL (ref 7.5–11.1)
PROTEINASE3 AB SER-ACNC: 22.4 U/ML (ref 0–3.5)
WBC # BLD AUTO: 17.7 K/MM3 (ref 4–10)
WBC # BLD AUTO: 18.6 K/MM3 (ref 4–10)

## 2017-08-08 RX ADMIN — MESALAMINE SCH MG: 800 TABLET, DELAYED RELEASE ORAL at 21:35

## 2017-08-08 RX ADMIN — HEPARIN SODIUM SCH UNIT: 5000 INJECTION, SOLUTION INTRAVENOUS; SUBCUTANEOUS at 09:38

## 2017-08-08 RX ADMIN — HYDROCORTISONE SODIUM SUCCINATE SCH MG: 100 INJECTION, POWDER, FOR SOLUTION INTRAMUSCULAR; INTRAVENOUS at 01:59

## 2017-08-08 RX ADMIN — MICONAZOLE NITRATE SCH APPLIC: 20 CREAM VAGINAL at 21:34

## 2017-08-08 RX ADMIN — Medication SCH TAB: at 09:39

## 2017-08-08 RX ADMIN — OXYCODONE HYDROCHLORIDE AND ACETAMINOPHEN SCH MG: 500 TABLET ORAL at 09:39

## 2017-08-08 RX ADMIN — POTASSIUM CHLORIDE SCH MEQ: 1500 TABLET, EXTENDED RELEASE ORAL at 09:38

## 2017-08-08 RX ADMIN — PREDNISONE SCH MG: 20 TABLET ORAL at 09:39

## 2017-08-08 RX ADMIN — Medication SCH TAB: at 21:34

## 2017-08-08 RX ADMIN — MESALAMINE SCH MG: 800 TABLET, DELAYED RELEASE ORAL at 07:05

## 2017-08-08 RX ADMIN — FERROUS SULFATE TAB EC 324 MG (65 MG FE EQUIVALENT) SCH MG: 324 (65 FE) TABLET DELAYED RESPONSE at 09:39

## 2017-08-08 RX ADMIN — MESALAMINE SCH MG: 800 TABLET, DELAYED RELEASE ORAL at 14:05

## 2017-08-08 NOTE — PN
Teaching Attending Note


Name of Resident: Vicenta Elam


ATTENDING PHYSICIAN STATEMENT





I saw and evaluated the patient.


I reviewed the resident's note and discussed the case with the resident.


I agree with the resident's findings and plan as documented.








SUBJECTIVE:c/o abdominal pain, not relieved with tylenol. states her diarrhea 

has improved with less frequency however continues to have BRBPR. denies Cp, SOB

, fever, chills, N/V. tolerating diet








OBJECTIVE:


 Last Vital Signs











Temp Pulse Resp BP Pulse Ox


 


 98.2 F   75   18   119/75   96 


 


 08/08/17 15:30  08/08/17 15:30  08/08/17 15:30  08/08/17 15:30  08/08/17 10:00








General NAD


Abdomen RLQ/LLQ tenderness soft hypoactive BS.





ASSESSMENT AND PLAN:


21-year-old woman with no medical history who presented to the ER with bloody 

diarrhea.


1. SIRS secondary to probable ulcerative colitis- clinically improving, however 

continues to have bloody BM. will start percocet for pain. on mesalamine, 

prednisone. low fiber diet. autoimmune workup pending


2. Acute blood loss anemia- due to bleeding likely due to colitis. continues to 

slowly trend down. repeat CBC today. if continues to trend down will also need 

to consider coagulopathic cause. will consider hematology eval. on iron 

supplements. no indication for txn at this time


3. Hypokalemia-resolved


4. Leukocytosis- likely due to steroids. no signs on active infection. received 

course of ceftriaxone/flagyl for suspected infectious colitis. afebrile


5. Hypocalcemia- forrest/vit d supplementation. on steroid therapy


6. vaginal pruritis- resolved per pt. received miconazole x1. will need to 

monitor closely as on steroid therapy which increases likelihood of fungal 

infections


7. DVT ppx- EAM. hold pharmacologic anticoagulation in setting of acute blood 

loss anemia

## 2017-08-08 NOTE — PN
Physical Exam: 


SUBJECTIVE: Patient seen and examined at bedside. Pt still having mild 

abdominal pain. Pt states that BMs are now formed, however they are still 

bloody. Denies SOB, chest pain, palpitations, or lightheadedness.








OBJECTIVE:





 Vital Signs











 Period  Temp  Pulse  Resp  BP Sys/Hung  Pulse Ox


 


 Last 24 Hr  98.2 F-99.4 F  55-75  16-20  110-131/57-86  











GENERAL: The patient is awake, alert, and fully oriented, in no acute distress.


HEAD: Normal with no signs of trauma.


EYES: PERRL, extraocular movements intact, sclera anicteric, conjunctiva clear. 


NECK: Trachea midline, full range of motion, supple. 


LUNGS: Breath sounds equal, clear to auscultation bilaterally, no wheezes, no 

crackles, no 


accessory muscle use. 


HEART: Regular rate and rhythm, S1, S2 without murmur, rub or gallop.


ABDOMEN: Soft, mildly tender, nondistended, normoactive bowel sounds, no 

guarding, no 


rebound


EXTREMITIES: 2+ posterior tibial pulses, warm, well-perfused, no edema. 


NEUROLOGICAL: Cranial nerves II through XII grossly intact.














 Laboratory Results - last 24 hr











  08/08/17 08/08/17





  07:15 07:15


 


WBC  17.7 H D 


 


RBC  2.82 L 


 


Hgb  8.0 L 


 


Hct  24.6 L 


 


MCV  87.2 


 


MCH  28.5 


 


MCHC  32.7 


 


RDW  15.8 H 


 


Plt Count  400 


 


MPV  9.1 


 


Neutrophils %  87.6 H 


 


Lymphocytes %  7.9 L 


 


Monocytes %  4.2 


 


Eosinophils %  0.0  D 


 


Basophils %  0.3 


 


Sodium   145


 


Potassium   3.7


 


Chloride   107


 


Carbon Dioxide   30


 


Anion Gap   8


 


BUN   8


 


Creatinine   0.4 L


 


Random Glucose   104  D


 


Calcium   8.0 L








Active Medications











Generic Name Dose Route Start Last Admin





  Trade Name Freq  PRN Reason Stop Dose Admin


 


Acetaminophen  650 mg 07/30/17 09:52 08/07/17 21:36





  Tylenol -  PO   650 mg





  Q4H PRN   Administration





  FEVER OR PAIN   


 


Acetaminophen  325 mg 08/08/17 12:09  





  Tylenol -  PO   





  Q4H PRN   





  PAIN   


 


Ascorbic Acid  500 mg 08/07/17 11:15 08/08/17 09:39





  Vitamin C -  PO   500 mg





  DAILY NORM   Administration


 


Calcium Carbonate/Cholecalciferol  1 tab 08/06/17 10:00 08/08/17 09:39





  Os-Pritesh 500+D -  PO   1 tab





  BID NORM   Administration


 


Ferrous Sulfate  325 mg 08/07/17 11:15 08/08/17 09:39





  Feosol -  PO   325 mg





  DAILY NORM   Administration


 


Heparin Sodium (Porcine)  5,000 unit 08/04/17 22:00 08/08/17 09:38





  Heparin -  SQ   5,000 unit





  BID NORM   Administration


 


Mesalamine  800 mg 08/04/17 14:00 08/08/17 14:05





  Asacol Hd -  PO   800 mg





  TID NORM   Administration


 


Miconazole Nitrate  1 applic 08/02/17 22:00 08/07/17 21:37





  Monistat-7 Vaginal Cream -  VG 08/08/17 22:01  1 applic





  HS NORM   Administration


 


Oxycodone HCl  5 mg 08/08/17 12:59  





  Roxicodone -  PO   





  Q4H PRN   





  PAIN   


 


Potassium Chloride  40 meq 08/08/17 10:00 08/08/17 09:38





  K-Dur -  PO   40 meq





  DAILY NORM   Administration


 


Prednisone  40 mg 08/08/17 10:00 08/08/17 09:39





  Deltasone -  PO   40 mg





  DAILY NORM   Administration











ASSESSMENT/PLAN:





This is a 22 y/o F with no significant PMH who presented to the ED with 1 week 

of bloody diarrhea. Pt admitted for sepsis secondary to ulcerative colitis.





# Sepsis secondary to ulcerative colitis


-afebrile, leukocytosis 17.7 (most likely from steroids)


-Continue mesalamine 800 mg PO TID, cortenema 100 mg RC HS, Prednisone 40mg PO 

qdaily, Solucortef 75 mg IVPB q8 


-Calcium carbonate/cholecalciferol added while pt on steroids


-Pain control with Tylenol 650 mg PO q4 PRN, Perkoset 5mg dosage added





#Hypokalemia-resolved


-Added 40 mEq PO Kdur to regime so pt does not become hypokalemic





#BRBPR


-Most likely d/t Ulcerative colitis


-Pt's last Hb 8 (down from 8.6) if continues to decrease, will consult 

Hematology to determine etiology


-Ferrous sulfate 325mg PO qdaily added with Vitamin C 500 mg PO qdaily


-F/u next CBC at 6pm today, 8/8/17 - if continues to decrease, will consult 

Hematology to determine etiology





#asymptomatic, +leukocyte esterase on  UA - resolved


-Urine cx: no growth





#Vaginal itching-resolved





DVT Prophylaxis


-EAM


-d/c Heparin d/t anemia





F/E/N


-Monitor electrolytes, especially potassium


-Low fiber diet





Visit type





- Emergency Visit


Emergency Visit: No





- New Patient


This patient is new to me today: No





- Critical Care


Critical Care patient: No

## 2017-08-08 NOTE — PN
GI Progress Note


Subjective: 


Passed stool and clotted blood after Cortenema last night.  Cramping tends to 

occur after the enema


Otherwise states feeling well


Biopsies from flex sig consistent with IBD


 





- Objective


Vital Signs: 


 Vital Signs











Temperature  99.2 F   08/08/17 07:51


 


Pulse Rate  63   08/08/17 07:51


 


Respiratory Rate  20   08/08/17 07:51


 


Blood Pressure  116/79   08/08/17 07:51


 


O2 Sat by Pulse Oximetry (%)  100   08/07/17 22:00











Constitutional: Calm


Eyes: No: Sclera Icterus


Cardiovascular: Yes: Regular Rate and Rhythm


Respiratory: Yes: CTA Bilaterally


Gastrointestinal Inspection: No: Scars


...Auscultate: Yes: Normoactive Bowel Sounds


...Palpate: No: Tenderness


...Percussion: No: Tympanitic


Edema: No


Neurological: Yes: Alert, Oriented


Labs: 


 CBC, BMP





 08/08/17 07:15 





 08/08/17 07:15 





 INR, PTT











INR  1.53  (0.82-1.09)  H  08/04/17  06:58    








 Hepatic Panel











Total Bilirubin  0.5 mg/dL (0.2-1.0)  D 08/05/17  07:35    


 


Direct Bilirubin  0.1 mg/dL (0.0-0.2)   08/05/17  07:35    


 


AST  26 U/L (15-37)   08/05/17  07:35    


 


ALT  21 U/L (12-78)  D 08/05/17  07:35    


 


Alkaline Phosphatase  41 U/L ()  L D 08/05/17  07:35    


 


Albumin  1.9 g/dl (3.4-5.0)  L  08/05/17  07:35    














Problem List





- Problems


(1) Ulcerative colitis


Assessment/Plan: 


Stopped cortenema. May be precipitating abdominal cramping


Monitor for ongoing bleeding.  Suspect still residual active colitis, plus she 

is on A/C, had biopsies performed and had a baseline coagulopathy. Would 

reevealuate coagulopathy as well.  Repeat CBC put in for this evening. WBC has 

increased however she has been on corticosteroid therapy.  Would monitor 

clinically


Continue prednisone 40mg once daily, Asacol HD


Iron / calcium with vitamin D


Would monitor for today and hold D/C 








Code(s): K51.90 - ULCERATIVE COLITIS, UNSPECIFIED, WITHOUT COMPLICATIONS

## 2017-08-09 LAB
ALBUMIN SERPL-MCNC: 1.9 G/DL (ref 3.4–5)
ALP SERPL-CCNC: 48 U/L (ref 45–117)
ALT SERPL-CCNC: 24 U/L (ref 12–78)
ANION GAP SERPL CALC-SCNC: 7 MMOL/L (ref 8–16)
ANISOCYTOSIS BLD QL SMEAR: (no result)
AST SERPL-CCNC: 20 U/L (ref 15–37)
BILIRUB SERPL-MCNC: 0.4 MG/DL (ref 0.2–1)
CALCIUM SERPL-MCNC: 7.7 MG/DL (ref 8.5–10.1)
CO2 SERPL-SCNC: 29 MMOL/L (ref 21–32)
CREAT SERPL-MCNC: 0.4 MG/DL (ref 0.55–1.02)
CRP SERPL-MCNC: 0.8 MG/DL (ref 0–0.3)
DEPRECATED RDW RBC AUTO: 15.7 % (ref 11.6–15.6)
DEPRECATED RDW RBC AUTO: 15.9 % (ref 11.6–15.6)
GLUCOSE SERPL-MCNC: 74 MG/DL (ref 74–106)
MACROCYTES BLD QL SMEAR: (no result)
MCH RBC QN AUTO: 28.6 PG (ref 25.7–33.7)
MCH RBC QN AUTO: 29.4 PG (ref 25.7–33.7)
MCHC RBC AUTO-ENTMCNC: 32.9 G/DL (ref 32–36)
MCHC RBC AUTO-ENTMCNC: 33.5 G/DL (ref 32–36)
MCV RBC: 86.8 FL (ref 80–96)
MCV RBC: 87.8 FL (ref 80–96)
NEUTROPHILS # BLD: 80 % (ref 42.8–82.8)
PLATELET # BLD AUTO: 410 K/MM3 (ref 134–434)
PLATELET # BLD AUTO: 522 K/MM3 (ref 134–434)
PLATELET # BLD EST: ADEQUATE 10*3/UL
PMV BLD: 8.3 FL (ref 7.5–11.1)
PMV BLD: 8.5 FL (ref 7.5–11.1)
PROT SERPL-MCNC: 4.8 G/DL (ref 6.4–8.2)
TPMT RBC-CCNC: 15.6
WBC # BLD AUTO: 20.2 K/MM3 (ref 4–10)
WBC # BLD AUTO: 22.8 K/MM3 (ref 4–10)

## 2017-08-09 PROCEDURE — 30233N1 TRANSFUSION OF NONAUTOLOGOUS RED BLOOD CELLS INTO PERIPHERAL VEIN, PERCUTANEOUS APPROACH: ICD-10-PCS | Performed by: INTERNAL MEDICINE

## 2017-08-09 RX ADMIN — PREDNISONE SCH MG: 20 TABLET ORAL at 10:02

## 2017-08-09 RX ADMIN — PANTOPRAZOLE SODIUM SCH MG: 40 TABLET, DELAYED RELEASE ORAL at 10:03

## 2017-08-09 RX ADMIN — MESALAMINE SCH MG: 800 TABLET, DELAYED RELEASE ORAL at 21:21

## 2017-08-09 RX ADMIN — FERROUS SULFATE TAB EC 324 MG (65 MG FE EQUIVALENT) SCH MG: 324 (65 FE) TABLET DELAYED RESPONSE at 10:02

## 2017-08-09 RX ADMIN — MESALAMINE SCH MG: 800 TABLET, DELAYED RELEASE ORAL at 06:28

## 2017-08-09 RX ADMIN — Medication SCH TAB: at 10:03

## 2017-08-09 RX ADMIN — POTASSIUM CHLORIDE SCH MEQ: 1500 TABLET, EXTENDED RELEASE ORAL at 10:03

## 2017-08-09 RX ADMIN — Medication SCH TAB: at 21:21

## 2017-08-09 RX ADMIN — OXYCODONE HYDROCHLORIDE AND ACETAMINOPHEN SCH MG: 500 TABLET ORAL at 10:02

## 2017-08-09 RX ADMIN — MESALAMINE SCH MG: 800 TABLET, DELAYED RELEASE ORAL at 15:07

## 2017-08-09 NOTE — PN
GI Progress Note


Subjective: 


Initially found sitting in solarium with her mother. No acute events


Kathryn states feeling well.  Describes two semiformed green, non-bloody BM's 

last night.  She does describe more LUQ discomfort after "tensing up when she 

had her blood drawn this morning".  No fevers reported.  Hgb was noted to be 

6.8 this morning








- Objective


Vital Signs: 


 Vital Signs











Temperature  98.1 F   08/09/17 06:00


 


Pulse Rate  70   08/09/17 06:00


 


Respiratory Rate  16   08/09/17 06:00


 


Blood Pressure  108/53   08/09/17 06:00


 


O2 Sat by Pulse Oximetry (%)  96   08/08/17 22:00











Constitutional: Calm


Eyes: No: Sclera Icterus


Cardiovascular: Yes: Regular Rate and Rhythm


Respiratory: Yes: CTA Bilaterally


Gastrointestinal Inspection: No: Distention


...Auscultate: Yes: Normoactive Bowel Sounds


...Palpate: Yes: Tenderness (Mild TTP LUQ/flank).  No: Splenomegaly


...Percussion: No: Tympanitic


Edema: No


Neurological: Yes: Alert, Oriented


Labs: 


 CBC, BMP





 08/09/17 06:00 





 08/09/17 07:00 





 INR, PTT











INR  1.53  (0.82-1.09)  H  08/04/17  06:58    








 Hepatic Panel











Total Bilirubin  0.4 mg/dL (0.2-1.0)   08/09/17  07:00    


 


Direct Bilirubin  0.1 mg/dL (0.0-0.2)   08/05/17  07:35    


 


AST  20 U/L (15-37)  D 08/09/17  07:00    


 


ALT  24 U/L (12-78)   08/09/17  07:00    


 


Alkaline Phosphatase  48 U/L ()   08/09/17  07:00    


 


Albumin  1.9 g/dl (3.4-5.0)  L  08/09/17  07:00    














Problem List





- Problems


(1) Ulcerative colitis


Assessment/Plan: 


Overall, Kathryn continues to appear clinically improved.  She is tolerating PO 

and there has been no further rectal bleeding.


Her Hgb was 6.8 this morning and a blood transfusion has been ordered. A repeat 

CBC prior to transfusion was offered however Kathryn has declined and instead 

opted to have the blood transfusion.  


Leukocytosis has worsened.  ? if secondary to steroid effect. She received her 

last cortenema yesterday.  This was stopped due to worsening abdominal cramping 

and loose BM's after administrations.  If continued worsened left sided 

abdominal pain, would advise repeat CT scan of the abdomen and pelvis


If continued decline in H/H without overt bleeding, consider hematology 

evaluation


Continue current medication regimen








Code(s): K51.90 - ULCERATIVE COLITIS, UNSPECIFIED, WITHOUT COMPLICATIONS

## 2017-08-09 NOTE — PN
Physical Exam: 


SUBJECTIVE: Patient seen and examined at bedside. Pt still is endorsing mild 

abdominal discomfort, however states that she is no longer having bloody BMs. 

Mother and Father expressed concern over blood transfusion. Pt denies SOB, 

palpitations, or lightheadedness.








OBJECTIVE:





 Vital Signs











 Period  Temp  Pulse  Resp  BP Sys/Hung  Pulse Ox


 


 Last 24 Hr  97.7 F-98.9 F  69-83  14-20  107-122/53-69  96











GENERAL: The patient is awake, alert, and fully oriented, in no acute distress.


HEAD: Normal with no signs of trauma.


EYES: PERRL, extraocular movements intact, sclera anicteric, conjunctiva clear. 


NECK: Trachea midline, supple. 


LUNGS: Breath sounds equal, clear to auscultation bilaterally, no wheezes, no 

crackles, no 


accessory muscle use. 


HEART: Regular rate and rhythm, S1, S2 without murmur, rub or gallop.


ABDOMEN: Soft, mildly tender, nondistended, normoactive bowel sounds, no 

guarding, no 


rebound, no hepatosplenomegaly, no masses.


EXTREMITIES: 2+ posterior tibial pulses, warm, well-perfused, no edema. 


NEUROLOGICAL: Cranial nerves II through XII grossly intact. 

















 Laboratory Results - last 24 hr











  08/04/17 08/05/17 08/08/17





  13:50 07:35 19:40


 


WBC    18.6 H


 


RBC    2.79 L


 


Hgb    8.2 L


 


Hct    24.4 L


 


MCV    87.3


 


MCH    29.3


 


MCHC    33.5


 


RDW    15.7 H


 


Plt Count    483 H D


 


MPV    9.0


 


Neutrophils %    88.8 H


 


Lymphocytes %    7.0 L


 


Monocytes %    4.1


 


Eosinophils %    0.0


 


Basophils %    0.1


 


Band Neutrophils   


 


Nucleated RBCs   


 


Differential Comment   


 


Platelet Estimate   


 


Anisocytosis   


 


Macrocytosis   


 


Factor V Test Method    


 


Sodium   


 


Potassium   


 


Chloride   


 


Carbon Dioxide   


 


Anion Gap   


 


BUN   


 


Creatinine   


 


Creat Clearance w eGFR   


 


Random Glucose   


 


Calcium   


 


Total Bilirubin   


 


AST   


 


ALT   


 


Alkaline Phosphatase   


 


C-Reactive Protein   


 


Total Protein   


 


Albumin   


 


TPMT Activity, Quant   15.6 


 


Stool O & P Wet [place]    


 


c-ANCA   <1:20 


 


Proteinase 3 (PR3)   22.4 H 


 


p-ANCA   <1:20 


 


Atypical p-ANCA   <1:20 


 


Myeloperoxidase Ab   <9.0 


 


O & P Permanent Slide    


 


Blood Type   


 


Antibody Screen   


 


Crossmatch   














  08/09/17 08/09/17 08/09/17





  06:00 07:00 09:28


 


WBC  20.2 H  


 


RBC  2.37 L  


 


Hgb  6.8 L* D  


 


Hct  20.6 L D  


 


MCV  86.8  


 


MCH  28.6  


 


MCHC  32.9  


 


RDW  15.9 H  


 


Plt Count  410  


 


MPV  8.3  


 


Neutrophils %  80.0  


 


Lymphocytes %  15.0  D  


 


Monocytes %  3.0 L  


 


Eosinophils %   


 


Basophils %   


 


Band Neutrophils  2.0  D  


 


Nucleated RBCs  1 H  


 


Differential Comment  Manual diff done  


 


Platelet Estimate  Adequate  


 


Anisocytosis  2+  


 


Macrocytosis  Few  


 


Factor V Test Method   


 


Sodium   143 


 


Potassium   3.2 L 


 


Chloride   107 


 


Carbon Dioxide   29 


 


Anion Gap   7 L 


 


BUN   7 


 


Creatinine   0.4 L 


 


Creat Clearance w eGFR   > 60 


 


Random Glucose   74  D 


 


Calcium   7.7 L 


 


Total Bilirubin   0.4 


 


AST   20  D 


 


ALT   24 


 


Alkaline Phosphatase   48 


 


C-Reactive Protein   0.8 H D 


 


Total Protein   4.8 L 


 


Albumin   1.9 L 


 


TPMT Activity, Quant   


 


Stool O & P Wet Mount   


 


c-ANCA   


 


Proteinase 3 (PR3)   


 


p-ANCA   


 


Atypical p-ANCA   


 


Myeloperoxidase Ab   


 


O & P Permanent Slide   


 


Blood Type    O POSITIVE


 


Antibody Screen    Negative


 


Crossmatch    See Detail








Active Medications











Generic Name Dose Route Start Last Admin





  Trade Name Freq  PRN Reason Stop Dose Admin


 


Acetaminophen  650 mg 07/30/17 09:52 08/07/17 21:36





  Tylenol -  PO   650 mg





  Q4H PRN   Administration





  FEVER OR PAIN   


 


Acetaminophen  325 mg 08/08/17 12:09  





  Tylenol -  PO   





  Q4H PRN   





  PAIN   


 


Ascorbic Acid  500 mg 08/07/17 11:15 08/09/17 10:02





  Vitamin C -  PO   500 mg





  DAILY NORM   Administration


 


Calcium Carbonate/Cholecalciferol  1 tab 08/06/17 10:00 08/09/17 10:03





  Os-Pritesh 500+D -  PO   1 tab





  BID NORM   Administration


 


Ferrous Sulfate  325 mg 08/07/17 11:15 08/09/17 10:02





  Feosol -  PO   325 mg





  DAILY NORM   Administration


 


Mesalamine  800 mg 08/04/17 14:00 08/09/17 15:07





  Asacol Hd -  PO   800 mg





  TID NORM   Administration


 


Oxycodone HCl  5 mg 08/08/17 12:59 08/09/17 02:52





  Roxicodone -  PO   5 mg





  Q4H PRN   Administration





  PAIN   


 


Pantoprazole Sodium  40 mg 08/09/17 10:00 08/09/17 10:03





  Protonix -  PO   40 mg





  DAILY NORM   Administration


 


Potassium Chloride  40 meq 08/08/17 10:00 08/09/17 10:03





  K-Dur -  PO   40 meq





  DAILY NORM   Administration


 


Prednisone  40 mg 08/08/17 10:00 08/09/17 10:02





  Deltasone -  PO   40 mg





  DAILY NORM   Administration











ASSESSMENT/PLAN:





This is a 20 y/o F with no significant PMH who presented to the ED with 1 week 

of bloody diarrhea. Pt admitted for sepsis secondary to ulcerative colitis.





# Sepsis secondary to ulcerative colitis


-afebrile, leukocytosis 20.2 (most likely from steroids)


-Continue mesalamine 800 mg PO TID, Prednisone 40mg PO qdaily (will continue 

over next two months), Solucortef 75 mg IVPB q8 


-Calcium carbonate/cholecalciferol added while pt on steroids


-Pain control with Tylenol 650 mg PO q4 PRN, Roxicodone 5mg PO q4 PRN





#Hypokalemia-resolved


-Added 40 mEq PO Kdur to regime so pt does not become hypokalemic





#Acute blood loss anemia


-Most likely d/t Ulcerative colitis


-Pt receiving transfusion of 1 unit of PRBCs started at 2:10pm today, put in a 

stat CBC to F/u Hb for 6:20pm


-Follow up CBC tomorrow AM 


-Ferrous sulfate 325mg PO qdaily added with Vitamin C 500 mg PO qdaily





#asymptomatic, +leukocyte esterase on  UA - resolved


-Urine cx: no growth





#Vaginal itching-resolved





DVT Prophylaxis


-EAM


-d/c Heparin d/t anemia





F/E/N


-Monitor electrolytes, especially potassium


-Low fiber diet





Visit type





- Emergency Visit


Emergency Visit: No





- New Patient


This patient is new to me today: No





- Critical Care


Critical Care patient: No

## 2017-08-09 NOTE — PN
Teaching Attending Note


Name of Resident: Vicenta Elam


ATTENDING PHYSICIAN STATEMENT





I saw and evaluated the patient.


I reviewed the resident's note and discussed the case with the resident.


I agree with the resident's findings and plan as documented.








SUBJECTIVE:clinically improved. stools are no longer bloody and more formed. 

continues to have some mild RLQ/LLQ pain. denies CP, SOB, fever, chills, N/V








OBJECTIVE:


 Last Vital Signs











Temp Pulse Resp BP Pulse Ox


 


 98.1 F   70   16   108/53   96 


 


 08/09/17 06:00  08/09/17 06:00  08/09/17 06:00  08/09/17 06:00  08/08/17 22:00








General NAD


Abdomen soft +RLQ tenderness normoactive BS





ASSESSMENT AND PLAN:


21-year-old woman with no medical history who presented to the ER with bloody 

diarrhea.


1. SIRS secondary to probable ulcerative colitis- clinically improving, stools 

are more formed. will cont mesalamine and steroids. on mesalamine, prednisone. 

low fiber diet. autoimmune workup pending


2. Acute blood loss anemia- due to bleeding likely due to colitis. Hgb dropped 

to 6.8 after remaining stable yesterday. at first pt refused transfusion. then 

offered to repeat cbc counts to ensure that drop in Hgb is accurate as was 

stable yesterday and no repeat bloody BM. explained will need to repeat cbc 

after blood transfusion to ensure hgb corrected accurately. 


3. Hypokalemia-resolved


4. Leukocytosis- likely due to steroids. no signs on active infection. received 

course of ceftriaxone/flagyl for suspected infectious colitis. afebrile


5. Hypocalcemia- forrest/vit d supplementation. on steroid therapy


6. vaginal pruritis- resolved per pt. received miconazole x1. will need to 

monitor closely as on steroid therapy which increases likelihood of fungal 

infections


7. DVT ppx- EAM. hold pharmacologic anticoagulation in setting of acute blood 

loss anemia

## 2017-08-10 VITALS — DIASTOLIC BLOOD PRESSURE: 64 MMHG | TEMPERATURE: 98.6 F | SYSTOLIC BLOOD PRESSURE: 109 MMHG | HEART RATE: 84 BPM

## 2017-08-10 LAB
ANION GAP SERPL CALC-SCNC: 7 MMOL/L (ref 8–16)
BASOPHILS # BLD: 0.3 % (ref 0–2)
CALCIUM SERPL-MCNC: 7.7 MG/DL (ref 8.5–10.1)
CO2 SERPL-SCNC: 30 MMOL/L (ref 21–32)
CREAT SERPL-MCNC: 0.5 MG/DL (ref 0.55–1.02)
CRP SERPL-MCNC: 1.3 MG/DL (ref 0–0.3)
DEPRECATED RDW RBC AUTO: 15.2 % (ref 11.6–15.6)
EOSINOPHIL # BLD: 1.5 % (ref 0–4.5)
GLUCOSE SERPL-MCNC: 81 MG/DL (ref 74–106)
MCH RBC QN AUTO: 29 PG (ref 25.7–33.7)
MCHC RBC AUTO-ENTMCNC: 33.1 G/DL (ref 32–36)
MCV RBC: 87.6 FL (ref 80–96)
NEUTROPHILS # BLD: 76.4 % (ref 42.8–82.8)
PLATELET # BLD AUTO: 437 K/MM3 (ref 134–434)
PMV BLD: 9 FL (ref 7.5–11.1)
WBC # BLD AUTO: 14.5 K/MM3 (ref 4–10)

## 2017-08-10 RX ADMIN — MESALAMINE SCH MG: 800 TABLET, DELAYED RELEASE ORAL at 06:06

## 2017-08-10 RX ADMIN — Medication SCH TAB: at 10:30

## 2017-08-10 RX ADMIN — PANTOPRAZOLE SODIUM SCH MG: 40 TABLET, DELAYED RELEASE ORAL at 10:30

## 2017-08-10 RX ADMIN — OXYCODONE HYDROCHLORIDE AND ACETAMINOPHEN SCH MG: 500 TABLET ORAL at 10:30

## 2017-08-10 RX ADMIN — POTASSIUM CHLORIDE SCH MEQ: 1500 TABLET, EXTENDED RELEASE ORAL at 10:29

## 2017-08-10 RX ADMIN — PREDNISONE SCH MG: 20 TABLET ORAL at 10:30

## 2017-08-10 RX ADMIN — FERROUS SULFATE TAB EC 324 MG (65 MG FE EQUIVALENT) SCH MG: 324 (65 FE) TABLET DELAYED RESPONSE at 10:30

## 2017-08-10 RX ADMIN — MESALAMINE SCH MG: 800 TABLET, DELAYED RELEASE ORAL at 15:13

## 2017-08-10 NOTE — PN
Teaching Attending Note


Name of Resident: Vicenta Elam


ATTENDING PHYSICIAN STATEMENT





I saw and evaluated the patient.


I reviewed the resident's note and discussed the case with the resident.


I agree with the resident's findings and plan as documented.








SUBJECTIVE:pain has resolved. 2 BM in past 24H are more formed. no blood noted. 

denies CP, SOB< fever, chills








OBJECTIVE:


 Last Vital Signs











Temp Pulse Resp BP Pulse Ox


 


 98.6 F   84   18   109/64   96 


 


 08/10/17 10:00  08/10/17 10:00  08/10/17 10:00  08/10/17 10:00 08/08/17 22:00








General NAD


Abdomen soft NT/ND normoactive BS





ASSESSMENT AND PLAN:


21-year-old woman with no medical history who presented to the ER with bloody 

diarrhea.


1. SIRS secondary to ulcerative colitis- clinically improving, stools are more 

formed. will cont mesalamine and steroids. on mesalamine, prednisone. low fiber 

diet. autoimmune workup pending


2. Acute blood loss anemia- due to bleeding likely due to colitis. s/p 1 unit 

PRBC> hgb now stable. no indication for transfusion. cont iron supplements.  


3. Hypokalemia-resolved


4. Leukocytosis- likely due to steroids. no signs on active infection. received 

course of ceftriaxone/flagyl for suspected infectious colitis. afebrile


5. Hypocalcemia- forrest/vit d supplementation. on steroid therapy


6. vaginal pruritis- resolved per pt. received miconazole x1. will need to 

monitor closely as on steroid therapy which increases likelihood of fungal 

infections


7. DVT ppx- EAM. 


8. d/c home with close GI follow up. explained to pt and mother that will need 

to f/u with GI in 1 week and will need close surveillance. will need full 

colonoscopy once acute flare has resolved. verbalized understanding and agreed 

with plan

## 2017-08-10 NOTE — DS
Physical Exam: 


SUBJECTIVE: Patient seen and examined at bedside today. Pt states that her BM's 

have been more well-formed and she has not noted any blood. She does not feel 

SOB, chest pain, palpitations or any lightheadedness. Pt expressing readiness 

to go home. 








OBJECTIVE:





 Vital Signs











 Period  Temp  Pulse  Resp  BP Sys/Hung  Pulse Ox


 


 Last 24 Hr  98.1 F-99.4 F  65-84  18-20  109-112/63-70  100








PHYSICAL EXAM





GENERAL: The patient is awake, alert, and fully oriented, in no acute distress.


HEAD: Normal with no signs of trauma.


EYES: PERRL, extraocular movements intact, sclera anicteric, conjunctiva clear. 


ENT: Ears normal, nares patent, oropharynx clear without exudates, moist mucous 

membranes.


NECK: Trachea midline, full range of motion, supple. 


LUNGS: Breath sounds equal, clear to auscultation bilaterally, no wheezes, no 

crackles, no accessory muscle use. 


HEART: Regular rate and rhythm, S1, S2 without murmur, rub or gallop.


ABDOMEN: Soft, nontender, nondistended, normoactive bowel sounds, no guarding, 

no rebound, no hepatosplenomegaly, no masses.


EXTREMITIES: 2+ posterior tibial pulses, warm, well-perfused, no edema. 


NEUROLOGICAL: Cranial nerves II through XII grossly intact. 





VITALS TREND/LABS


 Vitals Trend











  07/30/17 07/30/17 07/30/17





  02:30 05:14 07:42


 


Temperature 98.5 F 99.1 F 98.6 F


 


Pulse Rate   


 


Respiratory   





Rate   


 


Blood Pressure   


 


O2 Sat by Pulse   





Oximetry (%)   














  07/30/17 07/30/17 07/30/17





  10:00 15:22 19:20


 


Temperature 102.4 F H 99.1 F 100.5 F H


 


Pulse Rate 120 H 120 H 85


 


Respiratory   





Rate   


 


Blood Pressure   


 


O2 Sat by Pulse   





Oximetry (%)   














  07/30/17 07/31/17 07/31/17





  21:20 02:51 06:00


 


Temperature 102.4 F H 101.5 F H 99.1 F


 


Pulse Rate 123 H  109 H


 


Respiratory   





Rate   


 


Blood Pressure   


 


O2 Sat by Pulse   





Oximetry (%)   














  07/31/17 07/31/17 07/31/17





  14:14 18:36 19:04


 


Temperature 99.3 F 101.5 F H 100.4 F H


 


Pulse Rate 112 H 107 H 


 


Respiratory   





Rate   


 


Blood Pressure   


 


O2 Sat by Pulse   





Oximetry (%)   














  07/31/17 08/01/17 08/01/17





  21:33 00:57 02:04


 


Temperature 100.5 F H 102.6 F H 100.8 F H


 


Pulse Rate 104 H  


 


Respiratory   





Rate   


 


Blood Pressure   


 


O2 Sat by Pulse   





Oximetry (%)   














  08/01/17 08/01/17 08/01/17





  03:50 06:21 08:27


 


Temperature 100.1 F H 99.5 F 100.1 F H


 


Pulse Rate   100 H


 


Respiratory   





Rate   


 


Blood Pressure   


 


O2 Sat by Pulse   





Oximetry (%)   














  08/01/17 08/01/17 08/01/17





  08:30 14:43 18:35


 


Temperature  100.9 F H 100.2 F H


 


Pulse Rate  108 H 108 H


 


Respiratory   





Rate   


 


Blood Pressure   


 


O2 Sat by Pulse 98  





Oximetry (%)   














  08/01/17 08/01/17 08/02/17





  21:00 21:55 02:42


 


Temperature  101.2 F H 99.4 F


 


Pulse Rate  105 H 


 


Respiratory   





Rate   


 


Blood Pressure   


 


O2 Sat by Pulse 98  





Oximetry (%)   














  08/02/17 08/02/17 08/02/17





  06:34 09:00 10:00


 


Temperature 98.1 F  98.1 F


 


Pulse Rate 93 H  96 H


 


Respiratory   





Rate   


 


Blood Pressure   


 


O2 Sat by Pulse  98 





Oximetry (%)   














  08/02/17 08/02/17 08/02/17





  15:42 18:50 21:00


 


Temperature 99.9 F H 98.2 F 


 


Pulse Rate 95 H 98 H 


 


Respiratory   





Rate   


 


Blood Pressure   


 


O2 Sat by Pulse   98





Oximetry (%)   














  08/02/17 08/03/17 08/03/17





  22:54 06:50 08:41


 


Temperature 99.8 F H 99.9 F H 


 


Pulse Rate  102 H 


 


Respiratory   





Rate   


 


Blood Pressure   


 


O2 Sat by Pulse   99





Oximetry (%)   














  08/03/17 08/03/17 08/03/17





  09:00 15:07 18:43


 


Temperature 99.1 F 100.8 F H 100.1 F H


 


Pulse Rate 96 H 104 H 104 H


 


Respiratory   





Rate   


 


Blood Pressure   


 


O2 Sat by Pulse   





Oximetry (%)   














  08/03/17 08/04/17 08/04/17





  22:00 02:00 02:56


 


Temperature 99.9 F H 100.7 F H 99.7 F H


 


Pulse Rate 106 H  


 


Respiratory   





Rate   


 


Blood Pressure   


 


O2 Sat by Pulse   





Oximetry (%)   














  08/04/17 08/04/17 08/04/17





  06:00 09:45 11:11


 


Temperature 99.8 F H 100.6 F H 98.3 F


 


Pulse Rate 95 H 96 H 105 H


 


Respiratory   





Rate   


 


Blood Pressure   


 


O2 Sat by Pulse   





Oximetry (%)   














  08/04/17 08/04/17 08/04/17





  11:26 11:41 12:15


 


Temperature   100.1 F H


 


Pulse Rate 94 H 96 H 96 H


 


Respiratory   





Rate   


 


Blood Pressure   


 


O2 Sat by Pulse   





Oximetry (%)   














  08/04/17 08/04/17 08/04/17





  15:16 19:57 22:00


 


Temperature 100.6 F H 99.7 F H 99.5 F


 


Pulse Rate 100 H 95 H 104 H


 


Respiratory   





Rate   


 


Blood Pressure   


 


O2 Sat by Pulse   





Oximetry (%)   














  08/05/17 08/05/17 08/05/17





  06:00 10:00 15:15


 


Temperature 98.1 F 98.6 F 98 F


 


Pulse Rate 73 66 86


 


Respiratory   





Rate   


 


Blood Pressure   


 


O2 Sat by Pulse   





Oximetry (%)   














  08/05/17 08/05/17 08/06/17





  19:00 22:00 06:00


 


Temperature 97.8 F 98.2 F 


 


Pulse Rate 75 77 


 


Respiratory   18





Rate   


 


Blood Pressure   


 


O2 Sat by Pulse   





Oximetry (%)   














  08/06/17 08/06/17 08/06/17





  09:00 10:00 15:16


 


Temperature   


 


Pulse Rate   


 


Respiratory 18 18 18





Rate   


 


Blood Pressure   


 


O2 Sat by Pulse   





Oximetry (%)   














  08/06/17 08/06/17 08/07/17





  19:00 22:00 06:00


 


Temperature   97.9 F


 


Pulse Rate   


 


Respiratory 18 18 18





Rate   


 


Blood Pressure   113/64


 


O2 Sat by Pulse   





Oximetry (%)   














  08/07/17 08/07/17 08/07/17





  10:00 14:45 18:00


 


Temperature 98.3 F 98.8 F 98.2 F


 


Pulse Rate   


 


Respiratory 18 18 18





Rate   


 


Blood Pressure 107/77 120/75 116/57


 


O2 Sat by Pulse   





Oximetry (%)   














  08/07/17 08/08/17 08/08/17





  22:00 07:51 09:00


 


Temperature 98.2 F 99.2 F 99.4 F


 


Pulse Rate   


 


Respiratory 16 20 18





Rate   


 


Blood Pressure 131/86 116/79 110/73


 


O2 Sat by Pulse   





Oximetry (%)   














  08/08/17 08/08/17 08/08/17





  15:30 18:00 22:00


 


Temperature 98.2 F 97.7 F 98.2 F


 


Pulse Rate   


 


Respiratory 18 20 14





Rate   


 


Blood Pressure 119/75 122/69 114/69


 


O2 Sat by Pulse   





Oximetry (%)   














  08/09/17 08/09/17 08/10/17





  06:00 14:41 02:03


 


Temperature 98.1 F 98.9 F 99.4 F


 


Pulse Rate   


 


Respiratory 16 20 20





Rate   


 


Blood Pressure 108/53 107/64 109/63


 


O2 Sat by Pulse   





Oximetry (%)   














  08/10/17 08/10/17





  06:00 10:00


 


Temperature 98.1 F 98.6 F


 


Pulse Rate  


 


Respiratory 20 18





Rate  


 


Blood Pressure 112/70 109/64


 


O2 Sat by Pulse  





Oximetry (%)  








 Laboratory Tests











  07/30/17 07/30/17 07/30/17





  01:33 01:33 01:33


 


WBC   


 


Hgb   


 


Hct  32.2 L  


 


RDW   


 


Neutrophils %   


 


INR    1.61 H


 


Sodium   132 L 


 


Potassium   3.5 


 


Chloride   


 


BUN   


 


Creatinine   


 


Iron   


 


TIBC   


 


Iron Saturation   


 


Alkaline Phosphatase   


 


C-Reactive Protein   


 


Total Protein   


 


Albumin   














  07/30/17 07/31/17 07/31/17





  16:30 06:00 06:00


 


WBC   


 


Hgb   9.6 L 


 


Hct   28.3 L 


 


RDW   


 


Neutrophils %   


 


INR   


 


Sodium    138


 


Potassium    3.2 L


 


Chloride   


 


BUN   


 


Creatinine   


 


Iron  13 L  


 


TIBC  169 L  


 


Iron Saturation  8 L  


 


Alkaline Phosphatase   


 


C-Reactive Protein   


 


Total Protein   


 


Albumin   














  08/01/17 08/01/17 08/02/17





  06:00 06:00 07:40


 


WBC   


 


Hgb   10.2 L 


 


Hct   30.8 L 


 


RDW   


 


Neutrophils %   


 


INR   


 


Sodium  141   143


 


Potassium  3.4 L   3.1 L


 


Chloride  109 H   109 H


 


BUN  4 L   4 L


 


Creatinine  0.5 L   0.4 L


 


Iron   


 


TIBC   


 


Iron Saturation   


 


Alkaline Phosphatase   


 


C-Reactive Protein   


 


Total Protein   


 


Albumin   














  08/02/17 08/02/17 08/03/17





  07:40 07:40 06:15


 


WBC   


 


Hgb  9.6 L   8.9 L


 


Hct  28.1 L   26.2 L


 


RDW   


 


Neutrophils %   


 


INR   1.66 H 


 


Sodium   


 


Potassium   


 


Chloride   


 


BUN   


 


Creatinine   


 


Iron   


 


TIBC   


 


Iron Saturation   


 


Alkaline Phosphatase   


 


C-Reactive Protein   


 


Total Protein   


 


Albumin   














  08/03/17 08/03/17 08/04/17





  06:15 06:15 06:58


 


WBC   


 


Hgb   


 


Hct   


 


RDW   


 


Neutrophils %   


 


INR    1.53 H


 


Sodium   


 


Potassium  2.9 L*  


 


Chloride   


 


BUN  3 L D  


 


Creatinine  0.4 L  


 


Iron   


 


TIBC   


 


Iron Saturation   


 


Alkaline Phosphatase  33 L D  


 


C-Reactive Protein   11.1 H D 


 


Total Protein  5.1 L D  


 


Albumin  1.8 L D  














  08/05/17 08/05/17 08/05/17





  07:35 07:35 07:35


 


WBC   


 


Hgb   9.3 L 


 


Hct   28.6 L 


 


RDW   15.8 H 


 


Neutrophils %   


 


INR   


 


Sodium   


 


Potassium   


 


Chloride   


 


BUN  4 L D  


 


Creatinine  0.3 L D  


 


Iron   


 


TIBC   


 


Iron Saturation   


 


Alkaline Phosphatase    41 L D


 


C-Reactive Protein  5.8 H D  


 


Total Protein    5.6 L


 


Albumin    1.9 L














  08/06/17 08/06/17 08/07/17





  07:20 07:20 06:50


 


WBC   11.1 H  13.2 H


 


Hgb   8.6 L  8.6 L


 


Hct   25.9 L  26.3 L


 


RDW   


 


Neutrophils %   83.2 H  87.6 H


 


INR   


 


Sodium  144  


 


Potassium  4.3  


 


Chloride   


 


BUN  7  D  


 


Creatinine  0.3 L  


 


Iron   


 


TIBC   


 


Iron Saturation   


 


Alkaline Phosphatase   


 


C-Reactive Protein  2.6 H D  


 


Total Protein   


 


Albumin   














  08/07/17 08/08/17 08/08/17





  06:50 07:15 07:15


 


WBC   17.7 H D 


 


Hgb   


 


Hct   


 


RDW   


 


Neutrophils %   87.6 H 


 


INR   


 


Sodium  144   145


 


Potassium  4.1   3.7


 


Chloride   


 


BUN  8   8


 


Creatinine  0.5 L D   0.4 L


 


Iron   


 


TIBC   


 


Iron Saturation   


 


Alkaline Phosphatase   


 


C-Reactive Protein   


 


Total Protein   


 


Albumin   














  08/09/17 08/09/17 08/10/17





  06:00 07:00 05:47


 


WBC   


 


Hgb  6.8 L* D  


 


Hct   


 


RDW   


 


Neutrophils %   


 


INR   


 


Sodium   143  144


 


Potassium   3.2 L  3.6


 


Chloride   


 


BUN   7  10  D


 


Creatinine   0.4 L  0.5 L D


 


Iron   


 


TIBC   


 


Iron Saturation   


 


Alkaline Phosphatase   


 


C-Reactive Protein   


 


Total Protein   


 


Albumin   














  08/10/17





  05:47


 


WBC 


 


Hgb  8.7 L D


 


Hct  26.2 L


 


RDW 


 


Neutrophils % 


 


INR 


 


Sodium 


 


Potassium 


 


Chloride 


 


BUN 


 


Creatinine 


 


Iron 


 


TIBC 


 


Iron Saturation 


 


Alkaline Phosphatase 


 


C-Reactive Protein 


 


Total Protein 


 


Albumin 








MICROBIOLOGY AND IMAGING





7/30/17: Abdomen/Pelvis CT: nonspecific colitis; concentric wall thickening in 

colon, no definite pericolonic edema or fluid is noted, probable rectal 

involvement


7/31/17: Repeat abdomen/pelvis CT: no pneumoperitoneum noted


7/31/17-8/2/17: Stool shiga toxin, norovirus, blood cultures, C. diff antigen: 

negative


8/1/17: STOOL CX: MRSA, most likely colonization


8/4/17: Flexible sigmoidoscopy: results consistent with ulcerative colitis


8/4/17: COLON FLUID: no growth of campylobacer, yersinia, or E.coli 0157














HOSPITAL COURSE:





Date of Admission:07/30/17





Date of Discharge: 08/10/17





Admit diagnosis: sepsis secondary to colitis





Pre-admission course





22 y/o F with no significant PMH presents to ED with diarrhea for 1 week. As 

per pt, she ate at [facility] last Thursday (7/20). On Friday, she started having 

bloody bowel movements (7-8x daily), and felt nauseous during this time. She 

noted BRB on toilet paper when wiping and in toilet bowl. On Sunday, pt went to 

[facility] since her symptoms had not subsided, and was dx there with food 

poisoning. She was discharged with antibiotics and pain medications, which she 

took for the subsequent 3 days following. This past Thursday (7/27), pt began 

to have cramping in her lower abdomen which was constant and a 10/10.





Pt's LMP was 1 year ago, she is currently on Mirena IUD.





ER course was notable for:


(1) CT abdomen/pelvis: suggestive of colitis


(2) Dilaudid 0.5mg IVP given twice


(3) Levaquin, Flagyl for broad spectrum coverage





Hospital course





While on floor, pt was managed for colitis that was seen on Abdominal/Pelvis CT 

in ED. Initially it was thought that she had infectious colitis, as she 

developed symptoms after a meal at [facility]. Pt was on Levaquin and Flagyl 

course. Levaquin was later d/c and pt was put on Rocephin. However, pt 

continued to have diarrhea with loose bloody BM's and nausea/vomiting while on 

floor. Pt's hypokalemia from diarrhea was managed initially with IV NS with 

20mEq KCl, later transitioned to KCl. Pt was eventually determined to have 

ulcerative colitis based on flex sig. At this pt, abx were d/c. Her CRP was 

elevated at 11.1 and she was managed on Asacol 800 mg TID, prednisone 40mg PO 

daily (initially was on hydrocortisone rectally 100mg), ferrous sulfate 235 mg 

PO daily, calcium 500mg/vitamin D 1 tablet BID, Vitamin C 500mg PO daily. Pt 

also initially completed cortenemas (100 mg q8h), however these were stopped as 

they increased pt's abdominal discomfort. Pt was seen by GI throughout her 

hospitalization and will continue f/u on discharge. 





 Pt's H&H was inconsistent throughout admission, however towards the end of the 

month, it continued to drop, eventually reaching Hb of 6.8. At this point, pt 

was transfused 1 unit of PRBC's. After transfusion, her Hb trended up to 10.1 

and was at a level of 8.7 before her discharge. 





During her stay on floor, there was also concern for possible pneumoperitoneum 

as pt's abdomen was distended and she had severe amount of pain. However, stat 

CT on 7/31 suggested no pneumoperitoneum was present and no surgical 

intervention was needed. Pt also had positive MRSA stool culture, however this 

was most likely d/t colonization. 





Minutes to complete discharge: 32





Discharge Summary


Reason For Visit: COLITIS,RECTAL BLEEDING


Condition: Stable





- Instructions


Diet, Activity, Other Instructions: 


You were recently in the hospital since your colon was inflamed. Testing 

revealed that you have ulcerative colitis.





Please continue the following medications at home:





-Mesalamine (Asacol) 800 mg (1 tablet) three times a day by mouth


-Prednisone (Deltasone) 40 mg (2 tablets-each is 20mg) by mouth daily


-Ferrous sulfate 325mg (1 tablet) by mouth daily. This can may make your stool 

appear dark.


-Calcium 500 mg/Vitamin D- 1 tablet twice a day by mouth


-Vitamin C 500mg (1 capsule) by mouth daily. Please take this with the iron 

pill (Ferrous sulfate) to help its absorption.





We would like you to follow-up with the GI doctor, Dr. Gloria in 1 week. 

Please call and make an appointment with Dr. Gloria at this phone number: [phone]. Dr. Zarco is out of the office this week, but you may follow with 

him after. We would also like you to follow-up with your primary care doctor in 

a week.





If you develop shortness of breath, chest pain, become dizzy or feel lightheaded

, please go to the hospital. We hope you feel better soon.


Referrals: 


Enrique Gloria MD [Staff Physician] - 


Casey Doll MD [Staff Physician] - 


Disposition: HOME





- Home Medications


Comprehensive Discharge Medication List: 


Ambulatory Orders





Acetaminophen [Tylenol .Regular Strength -] 650 mg PO Q4H PRN #0 tablet 08/10/

17 


Ascorbic Acid [Vitamin C -] 500 mg PO DAILY #30 tablet 08/10/17 


Calcium 500Mg/Vit-D 200 Units [Os-Pritesh 500+D -] 1 tab PO BID #60 tablet 08/10/17 


Ferrous Sulfate [Feosol] 325 mg PO DAILY #30 tab 08/10/17 


Mesalamine [Asacol HD -] 800 mg PO TID #90 tab 08/10/17 


Prednisone [Deltasone -] 40 mg PO DAILY #60 tablet 08/10/17 








This patient is new to me today: No


Emergency Visit: No


Critical Care patient: No





- Discharge Referral


Referred to [facility] No

## 2018-08-23 ENCOUNTER — HOSPITAL ENCOUNTER (EMERGENCY)
Dept: HOSPITAL 74 - JER | Age: 23
Discharge: HOME | End: 2018-08-23
Payer: COMMERCIAL

## 2018-08-23 VITALS — SYSTOLIC BLOOD PRESSURE: 97 MMHG | DIASTOLIC BLOOD PRESSURE: 62 MMHG | HEART RATE: 87 BPM | TEMPERATURE: 99.3 F

## 2018-08-23 VITALS — BODY MASS INDEX: 21.6 KG/M2

## 2018-08-23 DIAGNOSIS — R10.30: ICD-10-CM

## 2018-08-23 DIAGNOSIS — R50.9: Primary | ICD-10-CM

## 2018-08-23 LAB
ALBUMIN SERPL-MCNC: 3.9 G/DL (ref 3.4–5)
ALP SERPL-CCNC: 75 U/L (ref 45–117)
ALT SERPL-CCNC: 16 U/L (ref 12–78)
ANION GAP SERPL CALC-SCNC: 10 MMOL/L (ref 8–16)
APPEARANCE UR: (no result)
AST SERPL-CCNC: 27 U/L (ref 15–37)
BACTERIA #/AREA URNS HPF: (no result) /HPF
BASOPHILS # BLD: 0.5 % (ref 0–2)
BILIRUB SERPL-MCNC: 0.7 MG/DL (ref 0.2–1)
BILIRUB UR STRIP.AUTO-MCNC: NEGATIVE MG/DL
BUN SERPL-MCNC: 11 MG/DL (ref 7–18)
CALCIUM SERPL-MCNC: 9 MG/DL (ref 8.5–10.1)
CHLORIDE SERPL-SCNC: 104 MMOL/L (ref 98–107)
CO2 SERPL-SCNC: 24 MMOL/L (ref 21–32)
COLOR UR: YELLOW
CREAT SERPL-MCNC: 0.8 MG/DL (ref 0.55–1.02)
DEPRECATED RDW RBC AUTO: 14.6 % (ref 11.6–15.6)
EOSINOPHIL # BLD: 0.1 % (ref 0–4.5)
EPITH CASTS URNS QL MICRO: (no result) /HPF
GLUCOSE SERPL-MCNC: 89 MG/DL (ref 74–106)
HCT VFR BLD CALC: 39.1 % (ref 32.4–45.2)
HGB BLD-MCNC: 13.6 GM/DL (ref 10.7–15.3)
KETONES UR QL STRIP: NEGATIVE
LEUKOCYTE ESTERASE UR QL STRIP.AUTO: (no result)
LYMPHOCYTES # BLD: 11.3 % (ref 8–40)
MCH RBC QN AUTO: 31.2 PG (ref 25.7–33.7)
MCHC RBC AUTO-ENTMCNC: 34.8 G/DL (ref 32–36)
MCV RBC: 89.7 FL (ref 80–96)
MONOCYTES # BLD AUTO: 12.3 % (ref 3.8–10.2)
MUCOUS THREADS URNS QL MICRO: (no result)
NEUTROPHILS # BLD: 75.8 % (ref 42.8–82.8)
NITRITE UR QL STRIP: NEGATIVE
PH UR: 6 [PH] (ref 5–8)
PLATELET # BLD AUTO: 217 K/MM3 (ref 134–434)
PMV BLD: 9.5 FL (ref 7.5–11.1)
POTASSIUM SERPLBLD-SCNC: 4.8 MMOL/L (ref 3.5–5.1)
PROT SERPL-MCNC: 8.3 G/DL (ref 6.4–8.2)
PROT UR QL STRIP: (no result)
PROT UR QL STRIP: NEGATIVE
RBC # BLD AUTO: 4.36 M/MM3 (ref 3.6–5.2)
SODIUM SERPL-SCNC: 138 MMOL/L (ref 136–145)
SP GR UR: 1.01 (ref 1–1.03)
UROBILINOGEN UR STRIP-MCNC: NEGATIVE MG/DL (ref 0.2–1)
WBC # BLD AUTO: 8.6 K/MM3 (ref 4–10)

## 2018-08-23 PROCEDURE — 3E033NZ INTRODUCTION OF ANALGESICS, HYPNOTICS, SEDATIVES INTO PERIPHERAL VEIN, PERCUTANEOUS APPROACH: ICD-10-PCS

## 2018-08-23 PROCEDURE — 3E033GC INTRODUCTION OF OTHER THERAPEUTIC SUBSTANCE INTO PERIPHERAL VEIN, PERCUTANEOUS APPROACH: ICD-10-PCS

## 2018-08-23 NOTE — PDOC
History of Present Illness





- General


Chief Complaint: Pain


Stated Complaint: FLARE UP abd pain , hx of u.colitis


Time Seen by Provider: 08/23/18 16:41





- History of Present Illness


Initial Comments: 


23 year old female with recent diagnosis of ulcerative colitis (<1 year prior) 

presenting with fever, nausea, vomiting, abdominal pain, and malodorous urine 

for the past few days. States that she has only had the initial episode of UC 

and it was treated with steroids and she was subsequently placed on a 

controller medication. She was symptom free until a few days ago. Describes her 

abdominal pain as left sided and crampy. Hasn't been able to measure her 

temperature at home but states she feels very warm and occasionally has chills. 

Denies any chest pain, SOB, or other symptoms. 








Past History





- Past Medical History


Allergies/Adverse Reactions: 


 Allergies











Allergy/AdvReac Type Severity Reaction Status Date / Time


 


No Known Allergies Allergy   Verified 08/23/18 16:41











Home Medications: 


Ambulatory Orders





Acetaminophen [Tylenol .Regular Strength -] 650 mg PO Q4H PRN #0 tablet 08/10/

17 


Ascorbic Acid [Vitamin C -] 500 mg PO DAILY #30 tablet 08/11/17 


Calcium 500Mg/Vit-D 200 Units [Os-Pritesh 500+D -] 1 tab PO BID #60 tablet 08/11/17 


Ferrous Sulfate [Feosol] 325 mg PO DAILY #30 tab 08/11/17 


Mesalamine [Asacol HD -] 800 mg PO TID #90 tab 08/11/17 


Cephalexin Monohydrate [Keflex -] 500 mg PO BID #14 capsule 08/23/18 








COPD: No


GI Disorders: Yes (ulcerative colitis)





- Suicide/Smoking/Psychosocial Hx


Smoking History: Never smoked


Have you smoked in the past 12 months: No


Information on smoking cessation initiated: No


Hx Alcohol Use: No


Drug/Substance Use Hx: No


Substance Use Type: None





**Review of Systems





- Review of Systems


Constitutional: Yes: Chills, Fever.  No: Weakness


HEENTM: No: Blurred Vision, Tearing


Respiratory: No: Cough, Shortness of Breath, Wheezing


Cardiac (ROS): No: Chest Pain, Irregular Heart Rate, Lightheadedness


ABD/GI: Yes: Nausea, Poor Appetite, Vomiting.  No: Diarrhea, Rectal Bleeding


: Yes: Frequency, Other (malodorous urine).  No: Burning, Dysuria, Discharge


Musculoskeletal: No: Back Pain, Joint Pain, Muscle Weakness


Integumentary: No: Bruising, Erythema, Flushing, Lesions


Neurological: No: Headache, Numbness, Paresthesia


Psychiatric: No: Anxiety, Depression


Endocrine: No: Excessive Sweating, Flushing, Intolerance to Cold


Hematologic/Lymphatic: No: Anemia, Blood Clots, Easy Bleeding





*Physical Exam





- Vital Signs


 Last Vital Signs











Temp Pulse Resp BP Pulse Ox


 


 101.6 F H  109 H  18   107/49   100 


 


 08/23/18 16:42  08/23/18 16:42  08/23/18 16:42  08/23/18 16:42  08/23/18 16:42














- Physical Exam


General Appearance: Yes: Nourished, Appropriately Dressed.  No: Apparent 

Distress


HEENT: positive: EOMI, GAVI, Normal ENT Inspection, Normal Voice


Neck: positive: Trachea midline, Normal Thyroid, Supple.  negative: Tender, 

Rigid


Respiratory/Chest: positive: Lungs Clear, Normal Breath Sounds.  negative: 

Chest Tender, Respiratory Distress


Cardiovascular: positive: Regular Rhythm, Tachycardia.  negative: Regular Rate


Gastrointestinal/Abdominal: positive: Normal Bowel Sounds, Tender (very mild 

left mid abdominla and left lower quadrant tenderness), Flat, Soft


Lymphatic: negative: Adenopathy, Tenderness


Musculoskeletal: positive: Normal Inspection.  negative: Decreased Range of 

Motion


Extremity: positive: Normal Capillary Refill, Normal Inspection, Normal Range 

of Motion.  negative: Tender


Integumentary: positive: Normal Color, Dry, Warm


Neurologic: positive: CNs II-XII NML intact, Fully Oriented, Alert, Normal Mood/

Affect, Normal Response, Motor Strength 5/5





ED Treatment Course





- LABORATORY


CBC & Chemistry Diagram: 


 08/23/18 16:45





 08/23/18 16:45





Medical Decision Making





- Medical Decision Making


23 year old female with new history of ulcerative colitis presenting with left 

sided abdominal pain, nausea, vomiting, and fevers. Highest suspicion is for 

UTI as abdominal exam is almost benign. Will defer imaging until urine and 

other labs return. Patient signed out to Dr. Landry in stable condition.





*DC/Admit/Observation/Transfer


Diagnosis at time of Disposition: 


Abdominal pain


Qualifiers:


 Abdominal location: lower abdomen, unspecified Qualified Code(s): R10.30 - 

Lower abdominal pain, unspecified





Fever


Qualifiers:


 Fever type: due to other condition Qualified Code(s): R50.81 - Fever 

presenting with conditions classified elsewhere








- Discharge Dispostion


Disposition: HOME


Condition at time of disposition: Good





- Prescriptions


Prescriptions: 


Cephalexin Monohydrate [Keflex -] 500 mg PO BID #14 capsule





- Referrals





- Patient Instructions


Printed Discharge Instructions:  DI for Urinary Tract Infection (UTI)


Additional Instructions: 


Please return if you have any new, worsening or concerning symptoms.





Please follow up with your PCP this week.





- Post Discharge Activity

## 2018-08-23 NOTE — PDOC
*Physical Exam





- Vital Signs


 Last Vital Signs











Temp Pulse Resp BP Pulse Ox


 


 101.6 F H  109 H  18   107/49   100 


 


 08/23/18 16:42  08/23/18 16:42  08/23/18 16:42  08/23/18 16:42  08/23/18 16:42














- Physical Exam


Comments: 





08/23/18 19:38


GENERAL: Awake, alert, and fully oriented, in no acute distress


ABDOMEN: Soft, +suprapubic tenderness, normoactive bowel sounds.  No guarding, 

no rebound.  No masses


EXTREMITIES: Normal inspection, Normal range of motion, no edema.  No clubbing 

or cyanosis.


NEUROLOGICAL: Cranial nerves II through XII grossly intact.  Normal speech, no 

focal sensorimotor deficits


SKIN: Warm, Dry, normal turgor, no rashes or lesions noted.











ED Treatment Course





- LABORATORY


CBC & Chemistry Diagram: 


 08/23/18 16:45





 08/23/18 16:45





- ADDITIONAL ORDERS


Additional order review: 


 Laboratory  Results











  08/23/18 08/23/18





  16:45 16:45


 


Sodium   138


 


Potassium   4.8


 


Chloride   104


 


Carbon Dioxide   24


 


Anion Gap   10


 


BUN   11


 


Creatinine   0.8


 


Creat Clearance w eGFR   > 60


 


Random Glucose   89


 


Lactic Acid  0.7 


 


Calcium   9.0


 


Total Bilirubin   0.7


 


AST   27


 


ALT   16


 


Alkaline Phosphatase   75


 


Total Protein   8.3 H


 


Albumin   3.9








 











  08/23/18





  16:45


 


RBC  4.36


 


MCV  89.7


 


MCHC  34.8


 


RDW  14.6


 


MPV  9.5


 


Neutrophils %  75.8


 


Lymphocytes %  11.3  D


 


Monocytes %  12.3 H D


 


Eosinophils %  0.1  D


 


Basophils %  0.5














- Medications


Given in the ED: 


ED Medications














Discontinued Medications














Generic Name Dose Route Start Last Admin





  Trade Name Chavoq  PRN Reason Stop Dose Admin


 


Acetaminophen  650 mg  08/23/18 16:45  08/23/18 17:12





  Tylenol -  PO  08/23/18 16:46  Not Given





  ONCE ONE   





     





     





     





     


 


Acetaminophen  1,000 mg  08/23/18 17:08  08/23/18 17:13





  Ofirmev Injection -  IVPB  08/23/18 17:09  1,000 mg





  ONCE ONE   Administration





     





     





     





     


 


Ondansetron HCl  4 mg  08/23/18 17:08  08/23/18 17:13





  Zofran Injection  IVPUSH  08/23/18 17:09  4 mg





  ONCE ONE   Administration





     





     





     





     


 


Sodium Chloride  1,000 ml  08/23/18 18:31  08/23/18 18:33





  Normal Saline -  IV  08/23/18 18:32  1,000 ml





  ONCE ONE   Administration





     





     





     





     














Medical Decision Making





- Medical Decision Making





08/23/18 19:37


Received signout from Dr Bustos. Patient is 23F with history of UC here today with 

suprapubic pain. Vital signs notable for fever and tachycardia. Labs normal, 

pending UA. If UA is positive, will start abx and d/c. If negative, will CT 

scan.


08/23/18 20:46


UA shows UTI. Now afebrile with normal HR. Appears well, tolerating PO. Asking 

to go home. Given keflex. Given return precautions. Instructed to f/u with pcp.





*DC/Admit/Observation/Transfer


Diagnosis at time of Disposition: 


 Abdominal pain, Fever








- Discharge Dispostion


Disposition: HOME


Condition at time of disposition: Good


Decision to Admit order: No





- Prescriptions


Prescriptions: 


Cephalexin Monohydrate [Keflex -] 500 mg PO BID #14 capsule





- Referrals





- Patient Instructions


Printed Discharge Instructions:  DI for Urinary Tract Infection (UTI)


Additional Instructions: 


Please return if you have any new, worsening or concerning symptoms.





Please follow up with your PCP this week.





- Post Discharge Activity

## 2018-08-23 NOTE — PDOC
Rapid Medical Evaluation


Time Seen by Provider: 08/23/18 16:41


Medical Evaluation: 


 Allergies











Allergy/AdvReac Type Severity Reaction Status Date / Time


 


No Known Allergies Allergy   Verified 07/29/17 21:37








I have performed a brief in-person evaluation of this patient. 


The patient presents with a chief complaint of:  fever x 3 days with minor 

cough. Highest was 103.1. Left lower abdominal pain.  Hx of UC.  No diarrhea


Pertinent physical exam findings:  none


I have ordered the following:  labs, UA/culture/hcg


The patient will proceed to the ED for further evaluation.











**Discharge Disposition





- Diagnosis


 Abdominal pain, Fever








- Referrals





- Patient Instructions





- Post Discharge Activity

## 2018-08-23 NOTE — PDOC
Attending Attestation





- Resident


Resident Name: Razia Bustos





- ED Attending Attestation


I have performed the following: I have examined & evaluated the patient, The 

case was reviewed & discussed with the resident, I agree w/resident's findings 

& plan, Exceptions are as noted





- HPI


HPI: 





08/23/18 18:32


23y F hx of UC on asacol presents with complaint of subjective fever, and L 

flank pain x 2 days. Pt notes some nausea/vomiting today associated with foul 

smelling urine today but denies any dysuria.  pt denies any diarrhea, cough, 

sob. 





on exam pt well appearing in no distress


pulm exam: cta b/l


cardiac exam: rrr, no mrg


abd: soft nontender, no rebound/guarding


+L cva tenderness





ddx: ?uti/pyelo


will ck labs, ua


will reassess


no focal abd tenderness to sgugesti colitis/uc flare











- Physicial Exam


PE: 





09/01/18 08:34


see above





- Medical Decision Making





08/23/18 19:32





pt signed out to evening team to reassess pt

## 2019-04-25 ENCOUNTER — HOSPITAL ENCOUNTER (EMERGENCY)
Dept: HOSPITAL 74 - JER | Age: 24
Discharge: HOME | End: 2019-04-25
Payer: COMMERCIAL

## 2019-04-25 VITALS — DIASTOLIC BLOOD PRESSURE: 63 MMHG | TEMPERATURE: 98.1 F | HEART RATE: 81 BPM | SYSTOLIC BLOOD PRESSURE: 100 MMHG

## 2019-04-25 VITALS — BODY MASS INDEX: 24 KG/M2

## 2019-04-25 DIAGNOSIS — O99.611: ICD-10-CM

## 2019-04-25 DIAGNOSIS — O23.31: ICD-10-CM

## 2019-04-25 DIAGNOSIS — K51.90: ICD-10-CM

## 2019-04-25 DIAGNOSIS — O26.891: Primary | ICD-10-CM

## 2019-04-25 DIAGNOSIS — Z3A.10: ICD-10-CM

## 2019-04-25 LAB
ALBUMIN SERPL-MCNC: 3.5 G/DL (ref 3.4–5)
ALP SERPL-CCNC: 68 U/L (ref 45–117)
ALT SERPL-CCNC: 15 U/L (ref 13–61)
ANION GAP SERPL CALC-SCNC: 8 MMOL/L (ref 8–16)
APPEARANCE UR: (no result)
AST SERPL-CCNC: 14 U/L (ref 15–37)
BACTERIA #/AREA URNS HPF: 5660.8 /HPF
BASOPHILS # BLD: 0.5 % (ref 0–2)
BILIRUB SERPL-MCNC: 0.5 MG/DL (ref 0.2–1)
BILIRUB UR STRIP.AUTO-MCNC: NEGATIVE MG/DL
BUN SERPL-MCNC: 5 MG/DL (ref 7–18)
CALCIUM SERPL-MCNC: 8.8 MG/DL (ref 8.5–10.1)
CASTS #/AREA URNS LPF: 7 /LPF (ref 0–8)
CHLORIDE SERPL-SCNC: 104 MMOL/L (ref 98–107)
CO2 SERPL-SCNC: 24 MMOL/L (ref 21–32)
COLOR UR: YELLOW
CREAT SERPL-MCNC: 0.6 MG/DL (ref 0.55–1.3)
DEPRECATED RDW RBC AUTO: 13.1 % (ref 11.6–15.6)
EOSINOPHIL # BLD: 2 % (ref 0–4.5)
EPITH CASTS URNS QL MICRO: 3.8 /HPF
GLUCOSE SERPL-MCNC: 73 MG/DL (ref 74–106)
HCT VFR BLD CALC: 35.4 % (ref 32.4–45.2)
HGB BLD-MCNC: 12.1 GM/DL (ref 10.7–15.3)
KETONES UR QL STRIP: (no result)
LEUKOCYTE ESTERASE UR QL STRIP.AUTO: (no result)
LIPASE SERPL-CCNC: 111 U/L (ref 73–393)
LYMPHOCYTES # BLD: 20.1 % (ref 8–40)
MCH RBC QN AUTO: 30.7 PG (ref 25.7–33.7)
MCHC RBC AUTO-ENTMCNC: 34.2 G/DL (ref 32–36)
MCV RBC: 89.7 FL (ref 80–96)
MONOCYTES # BLD AUTO: 17.2 % (ref 3.8–10.2)
NEUTROPHILS # BLD: 60.2 % (ref 42.8–82.8)
NITRITE UR QL STRIP: POSITIVE
PH UR: 6 [PH] (ref 5–8)
PLATELET # BLD AUTO: 211 K/MM3 (ref 134–434)
PMV BLD: 9 FL (ref 7.5–11.1)
POTASSIUM SERPLBLD-SCNC: 4.2 MMOL/L (ref 3.5–5.1)
PROT SERPL-MCNC: 7.5 G/DL (ref 6.4–8.2)
PROT UR QL STRIP: NEGATIVE
PROT UR QL STRIP: NEGATIVE
RBC # BLD AUTO: 1 /HPF (ref 0–4)
RBC # BLD AUTO: 3.95 M/MM3 (ref 3.6–5.2)
SODIUM SERPL-SCNC: 136 MMOL/L (ref 136–145)
SP GR UR: 1.01 (ref 1.01–1.03)
UROBILINOGEN UR STRIP-MCNC: 0.2 MG/DL (ref 0.2–1)
WBC # BLD AUTO: 5.5 K/MM3 (ref 4–10)
WBC # UR AUTO: 24 /HPF (ref 0–5)

## 2019-04-25 PROCEDURE — 3E033NZ INTRODUCTION OF ANALGESICS, HYPNOTICS, SEDATIVES INTO PERIPHERAL VEIN, PERCUTANEOUS APPROACH: ICD-10-PCS

## 2019-04-25 PROCEDURE — 3E033GC INTRODUCTION OF OTHER THERAPEUTIC SUBSTANCE INTO PERIPHERAL VEIN, PERCUTANEOUS APPROACH: ICD-10-PCS

## 2019-04-25 NOTE — PDOC
History of Present Illness





- General


Chief Complaint: Pain, Acute


Stated Complaint: 10 W PREG/ PAIN


Time Seen by Provider: 19 16:50


History Source: Patient





- History of Present Illness


Timing/Duration: reports: constant


Quality: reports: moderate





Past History





- Past Medical History


Allergies/Adverse Reactions: 


 Allergies











Allergy/AdvReac Type Severity Reaction Status Date / Time


 


No Known Allergies Allergy   Verified 19 16:50











Home Medications: 


Ambulatory Orders





Acetaminophen [Tylenol .Regular Strength -] 650 mg PO Q4H PRN #0 tablet 08/10/

17 


Ascorbic Acid [Vitamin C -] 500 mg PO DAILY #30 tablet 17 


Calcium 500Mg/Vit-D 200 Units [Os-Pritesh 500+D -] 1 tab PO BID #60 tablet 17 


Ferrous Sulfate [Feosol] 325 mg PO DAILY #30 tab 17 


Mesalamine [Asacol HD -] 800 mg PO TID #90 tab 17 


Cephalexin Monohydrate [Keflex -] 500 mg PO BID #14 capsule 18 








COPD: No


GI Disorders: Yes (ulcerative colitis)





- Immunization History


Immunization Up to Date: Yes





- Suicide/Smoking/Psychosocial Hx


Smoking History: Never smoked


Have you smoked in the past 12 months: No


Hx Alcohol Use: No


Drug/Substance Use Hx: No


Substance Use Type: None





**Review of Systems





- Review of Systems


Constitutional: No: Chills, Fever


ABD/GI: Yes: Blood Streaked Bowels, Diarrhea, Abdominal cramping.  No: Nausea, 

Vomiting


: No: Burning, Dysuria, Discharge, Flank Pain, Hematuria





*Physical Exam





- Vital Signs


 Last Vital Signs











Temp Pulse Resp BP Pulse Ox


 


 98.4 F   89   18   107/63   100 


 


 19 16:51  19 16:51  19 16:51  19 16:51  19 16:51














- Physical Exam


General Appearance: Yes: Appropriately Dressed.  No: Apparent Distress


HEENT: positive: Normal Voice


Neck: positive: Supple


Respiratory/Chest: negative: Respiratory Distress


Gastrointestinal/Abdominal: positive: Soft.  negative: Tender


Musculoskeletal: negative: CVA Tenderness


Integumentary: positive: Dry, Warm


Neurologic: positive: Fully Oriented, Alert, Normal Mood/Affect





ED Treatment Course





- LABORATORY


CBC & Chemistry Diagram: 


 19 18:06





 19 17:51





Medical Decision Making





- Medical Decision Making





19 18:19


24 yo F, h/o UC (no surgeries, on asacol, f/u [facility] of GI), , currently 

10 weeks pregnant (s/p US 2 weeks ago confirming IUP but does not remember if 

there was FHR), here w/ lower abd cramping x 2 days ago that feels like her UC 

flare but worse this time. Also reports 2-3 e/o bloody diarrhea since last 

night. No n/v/f/c. Have not been taking her asacol for 3 months mostly because 

she is not sure if meds is safe in pregnancy. Due to see her OB for the first 

time next week. Also has upcoming appt w/ her GI. No vag bleed or dysuria





See exam





?UC flare vs abd pain of pregnancy


No surgeries for US


Not currently taking asacol due to current preg (s/p confirmed IUP on US > 2 

weeks ago


-pain control


-labs


-US


-?CT


-reassess





19 18:50


Labs remarkable for uti. No flank pain, n/v/f/c to suggest pyelo. Will give 

dose of macrobid here (prior sensitivities reviewed). Pt signed out to night 

team pending rest of w/u and dispo


19 18:51








*DC/Admit/Observation/Transfer


Diagnosis at time of Disposition: 


 Abdominal cramping





UTI (urinary tract infection)


Qualifiers:


 Urinary tract infection type: site unspecified Hematuria presence: without 

hematuria Qualified Code(s): N39.0 - Urinary tract infection, site not specified








- Referrals





- Patient Instructions





- Post Discharge Activity

## 2019-04-25 NOTE — PDOC
*Physical Exam





- Vital Signs


 Last Vital Signs











Temp Pulse Resp BP Pulse Ox


 


 98.4 F   89   18   107/63   100 


 


 04/25/19 16:51  04/25/19 16:51  04/25/19 16:51  04/25/19 16:51  04/25/19 16:51














ED Treatment Course





- LABORATORY


CBC & Chemistry Diagram: 


 04/25/19 18:06





 04/25/19 17:51





- ADDITIONAL ORDERS


Additional order review: 


 Laboratory  Results











  04/25/19 04/25/19





  17:51 17:51


 


Sodium  136 


 


Potassium  4.2 


 


Chloride  104 


 


Carbon Dioxide  24 


 


Anion Gap  8 


 


BUN  5 L 


 


Creatinine  0.6 


 


Creat Clearance w eGFR  123.89 


 


Random Glucose  73 L 


 


Calcium  8.8 


 


Total Bilirubin  0.5 


 


AST  14 L 


 


ALT  15 


 


Alkaline Phosphatase  68 


 


Total Protein  7.5 


 


Albumin  3.5 


 


Lipase  111 


 


Beta HCG, Quant  01119.1 


 


Urine Color   Yellow


 


Urine Appearance   Cloudy


 


Urine pH   6.0


 


Ur Specific Gravity   1.013


 


Urine Protein   Negative


 


Urine Glucose (UA)   Negative


 


Urine Ketones   3+ H


 


Urine Blood   Negative


 


Urine Nitrite   Positive H


 


Urine Bilirubin   Negative


 


Urine Urobilinogen   0.2


 


Ur Leukocyte Esterase   1+ H


 


Urine WBC (Auto)   24


 


Urine RBC (Auto)   1


 


Urine Casts (Auto)   7


 


U Epithel Cells (Auto)   3.8


 


Urine Bacteria (Auto)   5660.8








 











  04/25/19





  18:06


 


RBC  3.95


 


MCV  89.7


 


MCHC  34.2


 


RDW  13.1  D


 


MPV  9.0


 


Neutrophils %  60.2  D


 


Lymphocytes %  20.1  D


 


Monocytes %  17.2 H


 


Eosinophils %  2.0  D


 


Basophils %  0.5














- Medications


Given in the ED: 


ED Medications














Discontinued Medications














Generic Name Dose Route Start Last Admin





  Trade Name Freq  PRN Reason Stop Dose Admin


 


Acetaminophen  1,000 mg  04/25/19 18:24  04/25/19 18:41





  Ofirmev Injection -  IVPB  04/25/19 18:25  1,000 mg





  ONCE ONE   Administration





     





     





     





     


 


Sodium Chloride  1,000 mls @ 1,000 mls/hr  04/25/19 18:24  04/25/19 18:41





  Normal Saline -  IV  04/25/19 19:23  1,000 mls/hr





  ASDIR STA   Administration





     





     





     





     














Medical Decision Making





- Medical Decision Making





04/25/19 20:48


Case discussed with CHELSEA Swanson


Agree with assessment and plan





*DC/Admit/Observation/Transfer


Diagnosis at time of Disposition: 


 Abdominal cramping





UTI (urinary tract infection)


Qualifiers:


 Urinary tract infection type: site unspecified Hematuria presence: without 

hematuria Qualified Code(s): N39.0 - Urinary tract infection, site not specified








- Referrals





- Patient Instructions





- Post Discharge Activity

## 2019-04-25 NOTE — PDOC
Rapid Medical Evaluation


Chief Complaint: Pain


Time Seen by Provider: 04/25/19 16:50


Medical Evaluation: 


 Allergies











Allergy/AdvReac Type Severity Reaction Status Date / Time


 


No Known Allergies Allergy   Verified 04/25/19 16:50











04/25/19 16:51


23 year 10 week pregnant female c/o lower abdominal pain and blood in stool. 

history of ulcerative colitis





Pe: patient alert ox3. 





A: abdominal pain





P: labs





stool guaiac 





ua





patient to the ER for further management. 





**Discharge Disposition





- Diagnosis


Abdominal pain


Qualifiers:


 Abdominal location: lower abdomen, unspecified Qualified Code(s): R10.30 - 

Lower abdominal pain, unspecified








- Referrals





- Patient Instructions





- Post Discharge Activity

## 2019-04-25 NOTE — PDOC
*Physical Exam





- Vital Signs


 Last Vital Signs











Temp Pulse Resp BP Pulse Ox


 


 98.4 F   89   18   107/63   100 


 


 04/25/19 16:51  04/25/19 16:51  04/25/19 16:51  04/25/19 16:51  04/25/19 16:51














- Physical Exam


Comments: 





04/25/19 19:35


I assume care of this 24yo F with h/o UC and 10wks pregnnancy confirmed on U/S 

a week ago presenting with complains of cramping lower abd pains for 2 days and 

episodes of bloody diarrhea since yesterday c/w her UC flareup. Pt on Asacol 

for her UC which she self d/c due to pregnancy.


Labs pending. pelvic U/S pending to evaluate pregnancy. CBC wnl. chemistry labs 

pending. Pt with h/o UTI which was not treated due to unable to reach pt. 1 

dose of Macrobid given to pt by previous provider. Reassess after labs, IVF and 

U/S








General Appearance: Yes: Nourished, Appropriately Dressed


HEENT: positive: Normal ENT Inspection


Neck: positive: Supple


Respiratory/Chest: positive: Lungs Clear.  negative: Respiratory Distress, 

Accessory Muscle Use


Cardiovascular: positive: Regular Rhythm, Regular Rate


Gastrointestinal/Abdominal: positive: Normal Bowel Sounds.  negative: Tender, 

Organomegaly, Pulsatile Mass


Musculoskeletal: positive: Normal Inspection


Extremity: positive: Normal Capillary Refill, Normal Range of Motion


Integumentary: positive: Normal Color


Neurologic: positive: Fully Oriented, Alert, Normal Response, Motor Strength 5/5





ED Treatment Course





- LABORATORY


CBC & Chemistry Diagram: 


 04/25/19 18:06





 04/25/19 17:51





- ADDITIONAL ORDERS


Additional order review: 


 Laboratory  Results











  04/25/19 04/25/19





  17:51 17:51


 


Sodium  136 


 


Potassium  4.2 


 


Chloride  104 


 


Carbon Dioxide  24 


 


Anion Gap  8 


 


BUN  5 L 


 


Creatinine  0.6 


 


Creat Clearance w eGFR  123.89 


 


Random Glucose  73 L 


 


Calcium  8.8 


 


Total Bilirubin  0.5 


 


AST  14 L 


 


ALT  15 


 


Alkaline Phosphatase  68 


 


Total Protein  7.5 


 


Albumin  3.5 


 


Lipase  111 


 


Beta HCG, Quant  29977.1 


 


Urine Color   Yellow


 


Urine Appearance   Cloudy


 


Urine pH   6.0


 


Ur Specific Gravity   1.013


 


Urine Protein   Negative


 


Urine Glucose (UA)   Negative


 


Urine Ketones   3+ H


 


Urine Blood   Negative


 


Urine Nitrite   Positive H


 


Urine Bilirubin   Negative


 


Urine Urobilinogen   0.2


 


Ur Leukocyte Esterase   1+ H


 


Urine WBC (Auto)   24


 


Urine RBC (Auto)   1


 


Urine Casts (Auto)   7


 


U Epithel Cells (Auto)   3.8


 


Urine Bacteria (Auto)   5660.8








 











  04/25/19





  18:06


 


RBC  3.95


 


MCV  89.7


 


MCHC  34.2


 


RDW  13.1  D


 


MPV  9.0


 


Neutrophils %  60.2  D


 


Lymphocytes %  20.1  D


 


Monocytes %  17.2 H


 


Eosinophils %  2.0  D


 


Basophils %  0.5














- Medications


Given in the ED: 


ED Medications














Discontinued Medications














Generic Name Dose Route Start Last Admin





  Trade Name Chavoq  PRN Reason Stop Dose Admin


 


Acetaminophen  1,000 mg  04/25/19 18:24  04/25/19 18:41





  Ofirmev Injection -  IVPB  04/25/19 18:25  1,000 mg





  ONCE ONE   Administration





     





     





     





     


 


Sodium Chloride  1,000 mls @ 1,000 mls/hr  04/25/19 18:24  04/25/19 18:41





  Normal Saline -  IV  04/25/19 19:23  1,000 mls/hr





  ASDIR STA   Administration





     





     





     





     














Medical Decision Making





- Medical Decision Making





04/25/19 19:39


I assume care of this 24yo F with h/o UC and 10wks pregnnancy confirmed on U/S 

a week ago presenting with complains of cramping lower abd pains for 2 days and 

episodes of bloody diarrhea since yesterday c/w her UC flareup. Pt on Asacol 

for her UC which she self d/c due to pregnancy.


Labs pending. pelvic U/S pending to evaluate pregnancy. CBC wnl. chemistry labs 

pending. Pt with h/o UTI which was not treated due to unable to reach pt. 1 

dose of Macrobid given to pt by previous provider. Reassess after labs, IVF and 

U/S


04/25/19 22:00


chemistry labs unremarkable. transvaginal US shows live IUP of 11.1wks GA with 

pos FH. Patient stable for outpatient management on pepcid for GI symptoms and 

macrobid for UTI . Patient has apt with OB in 5 days. Pt advised to hold off UC 

meds and follow-up with her GI to discuss taking medication during pregnancy. 

Patient report no pain now and stable for discharge





*DC/Admit/Observation/Transfer


Diagnosis at time of Disposition: 


 Abdominal cramping





UTI (urinary tract infection)


Qualifiers:


 Urinary tract infection type: site unspecified Hematuria presence: without 

hematuria Qualified Code(s): N39.0 - Urinary tract infection, site not specified





Ulcerative colitis, acute


Qualifiers:


 Digestive disease complication type: unspecified complication Qualified Code(s)

: K51.919 - Ulcerative colitis, unspecified with unspecified complications








- Discharge Dispostion


Disposition: HOME


Condition at time of disposition: Stable


Decision to Admit order: No





- Prescriptions


Prescriptions: 


Acetaminophen [Tylenol .Regular Strength -] 650 mg PO Q4H PRN #20 tablet


 PRN Reason: Fever Or Pain


Famotidine [Pepcid] 40 mg PO DAILY #10 tablet


Nitrofurantoin Macrocrystal [Macrodantin -] 100 mg PO BID 7 Days #14 capsule





- Referrals


Referrals: 


Josh Winslow MD [Non Staff, Medical] - 





- Patient Instructions


Printed Discharge Instructions:  Urinary Tract Infection


Additional Instructions: 


Take medications as prescribed. Follow-up with OB as scheduled. Follow-up with 

your GI Dr. Knowles  on management on ulcerative colitis on messalamine ( Asacol

) home medication





- Post Discharge Activity

## 2019-07-07 ENCOUNTER — HOSPITAL ENCOUNTER (EMERGENCY)
Dept: HOSPITAL 74 - JER | Age: 24
Discharge: ANOTHER HOSPITAL | End: 2019-07-07
Payer: COMMERCIAL

## 2019-07-07 VITALS — SYSTOLIC BLOOD PRESSURE: 106 MMHG | TEMPERATURE: 98.4 F | DIASTOLIC BLOOD PRESSURE: 69 MMHG | HEART RATE: 101 BPM

## 2019-07-07 VITALS — BODY MASS INDEX: 24.9 KG/M2

## 2019-07-07 DIAGNOSIS — O26.892: Primary | ICD-10-CM

## 2019-07-07 DIAGNOSIS — Z3A.21: ICD-10-CM

## 2019-07-07 DIAGNOSIS — K51.90: ICD-10-CM

## 2019-07-07 LAB
ALBUMIN SERPL-MCNC: 2.9 G/DL (ref 3.4–5)
ALP SERPL-CCNC: 87 U/L (ref 45–117)
ALT SERPL-CCNC: 12 U/L (ref 13–61)
ANION GAP SERPL CALC-SCNC: 6 MMOL/L (ref 8–16)
APPEARANCE UR: CLEAR
AST SERPL-CCNC: 12 U/L (ref 15–37)
BASOPHILS # BLD: 0.4 % (ref 0–2)
BILIRUB SERPL-MCNC: 0.3 MG/DL (ref 0.2–1)
BILIRUB UR STRIP.AUTO-MCNC: NEGATIVE MG/DL
BUN SERPL-MCNC: 4.6 MG/DL (ref 7–18)
CALCIUM SERPL-MCNC: 8.3 MG/DL (ref 8.5–10.1)
CHLORIDE SERPL-SCNC: 106 MMOL/L (ref 98–107)
CO2 SERPL-SCNC: 25 MMOL/L (ref 21–32)
COLOR UR: YELLOW
CREAT SERPL-MCNC: 0.5 MG/DL (ref 0.55–1.3)
DEPRECATED RDW RBC AUTO: 12.9 % (ref 11.6–15.6)
EOSINOPHIL # BLD: 4.8 % (ref 0–4.5)
GLUCOSE SERPL-MCNC: 70 MG/DL (ref 74–106)
HCT VFR BLD CALC: 32.8 % (ref 32.4–45.2)
HGB BLD-MCNC: 11 GM/DL (ref 10.7–15.3)
KETONES UR QL STRIP: (no result)
LEUKOCYTE ESTERASE UR QL STRIP.AUTO: NEGATIVE
LIPASE SERPL-CCNC: 136 U/L (ref 73–393)
LYMPHOCYTES # BLD: 16.6 % (ref 8–40)
MCH RBC QN AUTO: 30.5 PG (ref 25.7–33.7)
MCHC RBC AUTO-ENTMCNC: 33.6 G/DL (ref 32–36)
MCV RBC: 90.6 FL (ref 80–96)
MONOCYTES # BLD AUTO: 21.4 % (ref 3.8–10.2)
NEUTROPHILS # BLD: 56.8 % (ref 42.8–82.8)
NITRITE UR QL STRIP: NEGATIVE
PH UR: 6 [PH] (ref 5–8)
PLATELET # BLD AUTO: 224 K/MM3 (ref 134–434)
PMV BLD: 9.4 FL (ref 7.5–11.1)
POTASSIUM SERPLBLD-SCNC: 3.7 MMOL/L (ref 3.5–5.1)
PROT SERPL-MCNC: 7.1 G/DL (ref 6.4–8.2)
PROT UR QL STRIP: NEGATIVE
PROT UR QL STRIP: NEGATIVE
RBC # BLD AUTO: 3.62 M/MM3 (ref 3.6–5.2)
SODIUM SERPL-SCNC: 137 MMOL/L (ref 136–145)
SP GR UR: 1.01 (ref 1.01–1.03)
UROBILINOGEN UR STRIP-MCNC: 0.2 MG/DL (ref 0.2–1)
WBC # BLD AUTO: 7.5 K/MM3 (ref 4–10)

## 2019-07-07 PROCEDURE — 3E0337Z INTRODUCTION OF ELECTROLYTIC AND WATER BALANCE SUBSTANCE INTO PERIPHERAL VEIN, PERCUTANEOUS APPROACH: ICD-10-PCS

## 2019-07-07 NOTE — PDOC
History of Present Illness





- General


Chief Complaint: Diarrhea


Stated Complaint: 21 WKS/ FLARE UP


Time Seen by Provider: 07/07/19 11:42


History Source: Patient


Exam Limitations: No Limitations





- History of Present Illness


Initial Comments: 





07/07/19 12:04





22 yo F pmh ulcerative colitis who is 21 weeks pregnant presents with 2 days of 

loose bloody stools, abdominal cramping, loss of appetite, and new rash on her 

chest. She has 4-5 bloody BMs a day. Denies new onset fever, chills, chest pain

, SOB, lightheaded/dizziness, dysuria. Endorses recent ingestion of cold cuts 

and cheeses. Pt endorses vomiting (every morning) but states no change 

recently. 





Meds: Mesalamine


NKDA


No surgeries 





 














Past History





- Past Medical History


Allergies/Adverse Reactions: 


 Allergies











Allergy/AdvReac Type Severity Reaction Status Date / Time


 


No Known Allergies Allergy   Verified 07/07/19 10:22











Home Medications: 


Ambulatory Orders





Mesalamine [Asacol HD -] 800 mg PO TID #90 tab 08/11/17 








Anemia: Yes


COPD: No


GI Disorders: Yes (ulcerative colitis)





- Immunization History


Immunization Up to Date: Yes





- Suicide/Smoking/Psychosocial Hx


Smoking History: Never smoked


Have you smoked in the past 12 months: No


Hx Alcohol Use: No


Drug/Substance Use Hx: No


Substance Use Type: None





**Review of Systems





- Review of Systems


Able to Perform ROS?: Yes


Is the patient limited English proficient: No


Constitutional: Yes: Chills (not new. has since pregnancy), Loss of Appetite.  

No: Fever


Respiratory: No: Shortness of Breath


Cardiac (ROS): No: Chest Pain, Irregular Heart Rate, Lightheadedness, 

Palpitations


ABD/GI: Yes: Diarrhea (hpi, bloody ), Vomiting (see hpi), Abdominal cramping (

hpi )


: No: Dysuria


Neurological: No: Dizziness





*Physical Exam





- Vital Signs


 Last Vital Signs











Temp Pulse Resp BP Pulse Ox


 


 98.4 F   101 H  18   106/69   100 


 


 07/07/19 10:19  07/07/19 10:19  07/07/19 10:19  07/07/19 10:19  07/07/19 10:19














- Physical Exam


Comments: 





07/07/19 12:13





GEN: resting comfortably, NAD


CV: S1/S2, RRR, no m/r/g


LUNG: CTAB, no rales/wheezes


GI: soft, nt, nd, +BS 


SKIN: irregular macular rash with overlying white discharge in area between 

breasts 











ED Treatment Course





- LABORATORY


CBC & Chemistry Diagram: 


 07/07/19 11:53





 07/07/19 11:53





Medical Decision Making





- Medical Decision Making





07/07/19 12:14





22 yo pregnant F (21 weeks) with ulcerative colitis presents with 2 days of 

crampy abdominal pain and bloody stool. Endorses loss of appetite and recent 

ingestion of cold cuts and cheeses. Denies f/c/chestpain/SOB/swelling. Rash is 

likely yeast infection. 


DDx: UC exacerbation likely; unlikely listeria, gastroenteritis





- CBC, CMP, lipase, type and screen


- UA


- IV


- Fluids 


- c/s GI


- nystatin for rash 





Dispo: Transfer to tertiary center





07/07/19 13:11


Labs reviewed





Spoke with Dr. Zarco (GI on-call) - recommended patient transfer to tertiary 

care center due to high risk pregnancy. Does not recommend starting steroids at 

this moment. 








07/07/19 15:08


Message received from lab re: CBC manual diff not available at this timestamp.





07/07/19 16:03


US reviewed, spoke to M at [facility] who accepted patient (Dr. Hernandez)


Ambulance transport pending. 





07/07/19 19:01


Ambulance transport completed. 








*DC/Admit/Observation/Transfer


Diagnosis at time of Disposition: 


 Second trimester pregnancy





Ulcerative colitis, acute


Qualifiers:


 Digestive disease complication type: without complication Qualified Code(s): 

K51.90 - Ulcerative colitis, unspecified, without complications








- Discharge Dispostion


Disposition: TRANSFER ACUTE CARE/OTHER HOSP


Condition at time of disposition: Guarded





- Referrals


Referrals: 


ON STAFF,NOT [Primary Care Provider] - 





- Patient Instructions





- Post Discharge Activity





- Transfer to Acute Care Facility


Receiving Facility: [facility].


Accepting Physician:: Dr. Hernandez

## 2019-07-07 NOTE — PDOC
Documentation entered by Breana Zhu SCRIBE, acting as scribe for 

Marilyn Sierra MD.








Marilyn Sierra MD:  This documentation has been prepared by the Marko hidalgo Mackenzie, SCRIBE, under my direction and personally reviewed by me 

in its entirety.  I confirm that the documentation accurately reflects all work

, treatment, procedures, and medical decision making performed by me.  





Attending Attestation





- Resident


Resident Name: HigginsAri





- ED Attending Attestation


I have performed the following: I have examined & evaluated the patient, The 

case was reviewed & discussed with the resident, I agree w/resident's findings 

& plan, Exceptions are as noted





- HPI


HPI: 


The patient is a 23 year old female, with a significant PMH of ulcerative 

colitis who is 21 weeks pregnant and presents to the emergency department with 

2 days of abdominal cramping, loose bloody stool, rash, and loss of appetite. 

Patient reports having 4/5 bloody BMs a day for the past two days. Patient  

also reports developing a new pustular rash on her chest. Patient notes 

vomiting everyday however she states this is secondary to her pregnancy. 





The patient denies chest pain, shortness of breath, headache and dizziness.


Denies fever and chills.


Denies dysuria, frequency, urgency and hematuria.





Allergies: NKDA


Past surgical history: As per resident note


Social history: None reported


PCP: NOS





07/07/19 13:27








- Physicial Exam


PE: 





GENERAL: Awake, alert, and fully oriented, in no acute distress. Mild pallor


HEAD: No signs of trauma


EYES: PERRLA, EOMI, sclera anicteric, conjunctiva clear


ENT: Auricles normal inspection, hearing grossly normal, nares patent, 

oropharynx clear without exudates. Dry mucosa


NECK: Normal ROM, supple, no lymphadenopathy, JVD, or masses


LUNGS: Breath sounds equal, clear to auscultation bilaterally.  No wheezes, and 

no crackles


HEART: Regular rate and rhythm, normal S1 and S2, no murmurs, rubs or gallops


ABDOMEN: Soft, nontender, normoactive bowel sounds.  No guarding, no rebound. +

Gravid uterus


EXTREMITIES: Normal range of motion, no edema.  No clubbing or cyanosis. No 

cords, erythema, or tenderness


NEUROLOGICAL: Cranial nerves II through XII grossly intact.  Normal speech, 

normal gait. Motor and sensation intact


SKIN: Warm, dry, normal turgor. +Hyperpigmented moist rash between the breasts 

with white discharge. No erythema, no open lesions, no induration, no fluctuance











- Medical Decision Making





Pt noted to be mildly tachycardic, slightly pale, with nontender abdomen. Case d

/w GI Dr. Zarco, who recommended transfer to tertiary care center for further 

evaluation, as they will have MFM. He expressed concern that if her condition 

worsens or if she has heavy bleeding, it can complicate the pregnancy, and she 

would be safest in a place that could properly monitor. 





07/07/19 16:15


Case d/w Dr. Hernandez of MFM at [facility], accepted patient to L&D for transfer.

## 2019-07-10 NOTE — PATH
Surgical Pathology Report



Patient Name:  MARIA R THAKKAR

Accession #:  [id number]

Med. Rec. #:  [id number]                                                        

   /Age/Gender:  1995 (Age: 23) / F

Account:  [id number]                                                          

             Location: EMERGENCY ROOM

Taken:  2019

Received:  2019

Reported:  7/10/2019

Physicians:  Champ Fleming M.D.

  



Specimen(s) Received

 1 LAVENDER TOP 





Clinical History

Bloody stools, pregnancy, peripheral blood with immature WBC 







Final Diagnosis

COMPREH[facility] [place] ([id number]) shows the following:



INTERPRETATION: 

SLIGHTLY LEFT SHIFTED GRANULOCYTES.

THE CD34+ MYELOBLASTS ARE 0.2% OF TOTAL EVENTS.

MONOCYTOSIS, 15% OF TOTAL EVENTS.

THERE IS NO EVIDENCE OF B OR T-CELL PROLIFERATIVE DISORDERS.



Viability: 90% 

Phenotype: In the sample analyzed there is a mixed population of granulocytes,

monocytes, and lymphoid cells. CD34+ myeloblasts are 0.2%. Granulocytes are 67%

of total cells and show mild left shift. .Monocytes are 15% of total cells.

There is no overt abnormal myeloid antigen expression. The B-cells (1% of total)

appear polytypic. The T-cells (14% of total) show no pan T-cell antigenic

deletion.



See Emerge report to ([id number]) for details.





***Electronically Signed***

Champ Fleming M.D.